# Patient Record
Sex: FEMALE | Race: BLACK OR AFRICAN AMERICAN | Employment: FULL TIME | URBAN - METROPOLITAN AREA
[De-identification: names, ages, dates, MRNs, and addresses within clinical notes are randomized per-mention and may not be internally consistent; named-entity substitution may affect disease eponyms.]

---

## 2021-01-28 ENCOUNTER — HOSPITAL ENCOUNTER (EMERGENCY)
Age: 51
Discharge: HOME | End: 2021-01-28
Attending: FAMILY MEDICINE
Payer: COMMERCIAL

## 2021-01-28 VITALS
SYSTOLIC BLOOD PRESSURE: 178 MMHG | HEART RATE: 90 BPM | DIASTOLIC BLOOD PRESSURE: 88 MMHG | HEIGHT: 66 IN | OXYGEN SATURATION: 98 % | TEMPERATURE: 97.1 F | RESPIRATION RATE: 20 BRPM | WEIGHT: 220 LBS | BODY MASS INDEX: 35.36 KG/M2

## 2021-01-28 DIAGNOSIS — T30.0 BURN: Primary | ICD-10-CM

## 2021-01-28 PROCEDURE — 99283 EMERGENCY DEPT VISIT LOW MDM: CPT

## 2021-01-28 PROCEDURE — 6370000000 HC RX 637 (ALT 250 FOR IP): Performed by: FAMILY MEDICINE

## 2021-01-28 RX ORDER — DIAPER,BRIEF,INFANT-TODD,DISP
EACH MISCELLANEOUS ONCE
Status: COMPLETED | OUTPATIENT
Start: 2021-01-28 | End: 2021-01-28

## 2021-01-28 RX ORDER — TRAMADOL HYDROCHLORIDE 50 MG/1
50 TABLET ORAL EVERY 8 HOURS PRN
Qty: 12 TABLET | Refills: 0 | Status: SHIPPED | OUTPATIENT
Start: 2021-01-28 | End: 2021-02-01

## 2021-01-28 RX ADMIN — BACITRACIN ZINC: 500 OINTMENT TOPICAL at 20:37

## 2021-01-28 ASSESSMENT — PAIN DESCRIPTION - ORIENTATION: ORIENTATION: LEFT

## 2021-01-28 ASSESSMENT — PAIN - FUNCTIONAL ASSESSMENT: PAIN_FUNCTIONAL_ASSESSMENT: ACTIVITIES ARE NOT PREVENTED

## 2021-01-28 ASSESSMENT — PAIN DESCRIPTION - FREQUENCY: FREQUENCY: CONTINUOUS

## 2021-01-28 ASSESSMENT — PAIN DESCRIPTION - PROGRESSION: CLINICAL_PROGRESSION: NOT CHANGED
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
## 2021-08-15 ENCOUNTER — APPOINTMENT (OUTPATIENT)
Dept: GENERAL RADIOLOGY | Age: 51
End: 2021-08-15
Payer: COMMERCIAL

## 2021-08-15 ENCOUNTER — HOSPITAL ENCOUNTER (EMERGENCY)
Age: 51
Discharge: HOME | End: 2021-08-15
Payer: COMMERCIAL

## 2021-08-15 VITALS
RESPIRATION RATE: 19 BRPM | BODY MASS INDEX: 41.59 KG/M2 | HEIGHT: 67 IN | TEMPERATURE: 97.1 F | HEART RATE: 78 BPM | DIASTOLIC BLOOD PRESSURE: 90 MMHG | WEIGHT: 265 LBS | OXYGEN SATURATION: 95 % | SYSTOLIC BLOOD PRESSURE: 152 MMHG

## 2021-08-15 DIAGNOSIS — S90.31XA CONTUSION OF RIGHT FOOT, INITIAL ENCOUNTER: ICD-10-CM

## 2021-08-15 DIAGNOSIS — S80.02XA CONTUSION OF LEFT KNEE, INITIAL ENCOUNTER: ICD-10-CM
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
## 2021-08-15 DIAGNOSIS — S90.121A CONTUSION OF FIFTH TOE OF RIGHT FOOT, INITIAL ENCOUNTER: Primary | ICD-10-CM

## 2021-08-15 PROCEDURE — 99283 EMERGENCY DEPT VISIT LOW MDM: CPT

## 2021-08-15 PROCEDURE — 96372 THER/PROPH/DIAG INJ SC/IM: CPT

## 2021-08-15 PROCEDURE — 73630 X-RAY EXAM OF FOOT: CPT

## 2021-08-15 PROCEDURE — 73560 X-RAY EXAM OF KNEE 1 OR 2: CPT

## 2021-08-15 PROCEDURE — 6360000002 HC RX W HCPCS

## 2021-08-15 RX ORDER — FLUTICASONE PROPIONATE 50 MCG
1 SPRAY, SUSPENSION (ML) NASAL DAILY
COMMUNITY

## 2021-08-15 RX ORDER — KETOROLAC TROMETHAMINE 30 MG/ML
INJECTION, SOLUTION INTRAMUSCULAR; INTRAVENOUS
Status: COMPLETED
Start: 2021-08-15 | End: 2021-08-15

## 2021-08-15 RX ORDER — KETOROLAC TROMETHAMINE 30 MG/ML
30 INJECTION, SOLUTION INTRAMUSCULAR; INTRAVENOUS ONCE
Status: COMPLETED | OUTPATIENT
Start: 2021-08-15 | End: 2021-08-15

## 2021-08-15 RX ADMIN — KETOROLAC TROMETHAMINE 30 MG: 30 INJECTION, SOLUTION INTRAMUSCULAR at 20:49

## 2021-08-15 RX ADMIN — KETOROLAC TROMETHAMINE 30 MG: 30 INJECTION, SOLUTION INTRAMUSCULAR; INTRAVENOUS at 20:49

## 2021-08-15 ASSESSMENT — PAIN SCALES - GENERAL
PAINLEVEL_OUTOF10: 8
PAINLEVEL_OUTOF10: 8

## 2021-08-15 ASSESSMENT — PAIN DESCRIPTION - DESCRIPTORS: DESCRIPTORS: CONSTANT

## 2021-08-15 ASSESSMENT — PAIN DESCRIPTION - LOCATION: LOCATION: TOE (COMMENT WHICH ONE)

## 2021-08-15 ASSESSMENT — PAIN DESCRIPTION - PAIN TYPE: TYPE: ACUTE PAIN

## 2021-08-15 ASSESSMENT — PAIN DESCRIPTION - ORIENTATION: ORIENTATION: RIGHT
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
## 2021-08-16 NOTE — ED NOTES
Instructions provided and questions answered. Segundo tap,ACE wrap and post op shoe applied per RN. circ check wnl.      Serg Bird RN  08/15/21 5038

## 2021-10-08 ENCOUNTER — HOSPITAL ENCOUNTER (OUTPATIENT)
Age: 51
Discharge: HOME | End: 2021-10-10

## 2021-10-08 PROCEDURE — 88307 TISSUE EXAM BY PATHOLOGIST: CPT

## 2021-10-08 PROCEDURE — 88305 TISSUE EXAM BY PATHOLOGIST: CPT

## 2021-10-29 ENCOUNTER — HOSPITAL ENCOUNTER (OUTPATIENT)
Age: 51
Discharge: HOME | End: 2021-10-31

## 2021-11-03 ENCOUNTER — INITIAL CONSULT (OUTPATIENT)
Dept: BARIATRICS/WEIGHT MGMT | Age: 51
End: 2021-11-03
Payer: COMMERCIAL

## 2021-11-03 VITALS
SYSTOLIC BLOOD PRESSURE: 168 MMHG | HEIGHT: 67 IN | TEMPERATURE: 97.9 F | RESPIRATION RATE: 20 BRPM | HEART RATE: 80 BPM | WEIGHT: 269 LBS | DIASTOLIC BLOOD PRESSURE: 99 MMHG | BODY MASS INDEX: 42.22 KG/M2

## 2021-11-03 DIAGNOSIS — K21.9 GASTROESOPHAGEAL REFLUX DISEASE WITHOUT ESOPHAGITIS: ICD-10-CM

## 2021-11-03 DIAGNOSIS — E66.01 MORBID OBESITY DUE TO EXCESS CALORIES (HCC): Primary | ICD-10-CM

## 2021-11-03 PROCEDURE — 1036F TOBACCO NON-USER: CPT | Performed by: SURGERY

## 2021-11-03 PROCEDURE — G8417 CALC BMI ABV UP PARAM F/U: HCPCS | Performed by: SURGERY

## 2021-11-03 PROCEDURE — G8484 FLU IMMUNIZE NO ADMIN: HCPCS | Performed by: SURGERY

## 2021-11-03 PROCEDURE — 3017F COLORECTAL CA SCREEN DOC REV: CPT | Performed by: SURGERY

## 2021-11-03 PROCEDURE — 99202 OFFICE O/P NEW SF 15 MIN: CPT

## 2021-11-03 PROCEDURE — 99204 OFFICE O/P NEW MOD 45 MIN: CPT | Performed by: SURGERY

## 2021-11-03 PROCEDURE — G8427 DOCREV CUR MEDS BY ELIG CLIN: HCPCS | Performed by: SURGERY

## 2021-11-03 NOTE — PROGRESS NOTES
Kirke Ganser [facility]  11/3/2021  [facility]    Initial Evaluation  Bariatric Surgery and EGD       Subjective:  CHIEF COMPLAINT: Morbid obesity, malnutrition, GERD    HISTORY OF PRESENT ILLNESS: Diamond Valle is a morbidly-obese 46 y.o.  female, who weighs 269 lb (122 kg). She is 106 pounds over her ideal body weight. The Body mass index is 42.13 kg/m². She has multiple medical problems aggravated by her obesity. She wishes to have bariatric surgery so that she can lose a large amount of weight and keep the weight off. I have met with [facility] in the [facility] where we discussed the surgery in great detail and went over the risks and benefits. She has watched our informational video so she understands all of the extensive risks involved. She states that she understands all of the risks and wishes to proceed with the evaluation. Past Medical HistoryThere is no problem list on file for this patient. Past Surgical History  Past Surgical History:   Procedure Laterality Date    CHOLECYSTECTOMY        Allergies: Codeine   No family history on file. Home Medications  Current Outpatient Medications   Medication Sig Dispense Refill    Fexofenadine-Pseudoephedrine (ALLEGRA-D PO) Take by mouth      fluticasone (FLONASE) 50 MCG/ACT nasal spray 1 spray by Each Nostril route daily       No current facility-administered medications for this visit. Social History:   TOBACCO:   reports that she has never smoked. She has never used smokeless tobacco.  All smokers must join the free smoking cessation program and stop smoking for 3 months before having any Bariatric surgery. ETOH:    reports no history of alcohol use. The patient has a family history that is negative for severe cardiovascular or respiratory issues, negative for reaction to anesthesia.       Review of Systems    Review of Systems - General ROS: negative for - chills, fatigue or malaise  ENT ROS: negative for - hearing change, nasal congestion or nasal discharge  Allergy and Immunology ROS: negative for - hives, itchy/watery eyes or nasal congestion  Hematological and Lymphatic ROS: negative for - blood clots, blood transfusions, bruising or fatigue  Endocrine ROS: negative for - malaise/lethargy, mood swings, palpitations or polydipsia/polyuria  Breast ROS: negative for - new or changing breast lumps or nipple changes  Respiratory ROS: negative for - sputum changes, stridor, tachypnea or wheezing  Cardiovascular ROS: negative for - irregular heartbeat, loss of consciousness, murmur or orthopnea  Gastrointestinal ROS: negative for - constipation, diarrhea, gas/bloating, heartburn or hematemesis  Genito-Urinary ROS: negative for - genital discharge, genital ulcers or hematuria  Musculoskeletal ROS: negative for - gait disturbance, muscle pain or muscular weakness}    Physical Exam:   VITALS: BP (!) 168/99 (Site: Right Upper Arm, Position: Sitting, Cuff Size: Large Adult)   Pulse 80   Temp 97.9 °F (36.6 °C) (Temporal)   Resp 20   Ht 5' 7\" (1.702 m)   Wt 269 lb (122 kg)   BMI 42.13 kg/m²   General appearance: alert, appears stated age and cooperative  Head: Normocephalic, without obvious abnormality, atraumatic  Neck: no adenopathy, no carotid bruit, no JVD, supple, symmetrical, trachea midline and thyroid not enlarged, symmetric, no tenderness/mass/nodules  Lungs: clear to auscultation bilaterally  Heart: regular rate and rhythm  Abdomen: soft, non-tender; bowel sounds normal; no masses,  no organomegaly  Extremities: extremities normal, atraumatic, no cyanosis or edema  : no CVA tenderness    Assessment:  Morbid obesity with inability to keep the weight off with diet and exercise. She is interested in Laparoscopic Christ-en- Y Gastric Bypass or Sleeve Gastrectomy. We discussed that our goal is to ameliorate the medical problems and not to obtain a specific body mass index.  She understands the risks and benefits, and wishes to proceed with the evaluation. Plan:  Psych evaluation, medical and cardio clearance, gallbladder is out. These patients often have vitamin deficiencies so I will do screening labs for malnutrition. I will do an upper endoscopy to check for hiatal hernias, ulcers and H. Pylori while we wait for insurance approval for the surgery.     Physician Signature: Electronically signed by Dr. Marco Lipscomb MD    Send copy of H&P to PCP, Rebecca Carrillo,

## 2021-11-03 NOTE — PATIENT INSTRUCTIONS
What is the next step to proceed with weight loss surgery? Please be aware that any co-pays or deductibles may be requested prior to testing and / or procedures. You will need to schedule a psychological evaluation for weight loss surgery. Patients will be required to complete all psychological testing as required by the mental health provider. Patients must also follow all of the provider's recommendations before weight loss surgery can be scheduled. The evaluation must be done a standard way for weight loss surgery. We strongly recommend that you contact one of our preferred providers listed below to arrange this:      Cecil Briseno, [facility]  [address], [place]   [phone]    [facility] and [address]   [phone]    Dr. Maureen Puri, PhD    [facility]. [place], [place]    [phone]      You will also need to plan on attending a 2 hour nutrition class at the [facility] prior to your surgery. We will schedule this for you when we schedule your surgery. Please remember to have your labs drawn 10 days prior to your first scheduled dietary appointment. Please remember, that while we will submit your case to insurance for surgery authorization, it is your responsibility to know if your plan covers weight loss surgery and keep up-to-date with changes to your insurance coverage. We will do everything possible to help you get approved for weight loss surgery, but cannot guarantee an approval.     Please note that you will not be submitted to your insurance company until all pre-operative testing requirements are met.

## 2021-11-04 ENCOUNTER — TELEPHONE (OUTPATIENT)
Dept: BARIATRICS/WEIGHT MGMT | Age: 51
End: 2021-11-04

## 2021-11-04 ENCOUNTER — PREP FOR PROCEDURE (OUTPATIENT)
Dept: SURGERY | Age: 51
End: 2021-11-04

## 2021-11-04 RX ORDER — SODIUM CHLORIDE, SODIUM LACTATE, POTASSIUM CHLORIDE, CALCIUM CHLORIDE 600; 310; 30; 20 MG/100ML; MG/100ML; MG/100ML; MG/100ML
INJECTION, SOLUTION INTRAVENOUS CONTINUOUS
Status: CANCELLED | OUTPATIENT
Start: 2021-11-04

## 2021-11-04 NOTE — TELEPHONE ENCOUNTER
Prior Authorization Form  DEMOGRAPHICS:    Patient Name:  Santosh Owens  Patient :  1970            Insurance:  Payor: [employer] / Plan: [employer] [address] / Product Type: *No Product type* /   Insurance ID Number:    Payor/Plan Subscr  Sex Relation Sub. Ins. ID Effective Group Num   1.  [address]* 1970 Female Self [id number]                                   [address]         DIAGNOSIS & PROCEDURE:    Procedure/Operation: EGD           CPT Code: 10991    Diagnosis:  Mendel Herald    ICD10 Code: K21.9    Location:  [facility]    Surgeon:  Harsha Kumari INFORMATION:    Date: 2021    Time:                Anesthesia:  MAC/TIVA                                                       Status:  Outpatient        Special Comments:         Electronically signed by Virgilio Gonzalez MA on 2021 at 9:25 AM

## 2021-11-18 ENCOUNTER — ANESTHESIA EVENT (OUTPATIENT)
Dept: ENDOSCOPY | Age: 51
End: 2021-11-18
Payer: COMMERCIAL

## 2021-11-18 NOTE — PROGRESS NOTES
[address] [facility]                                                                                                                    PRE OP [facility]        Date: 11/18/2021    Date of surgery: 11/19/21   Arrival Time: Hospital will call you between 5pm and 7pm with your final arrival time for surgery    1. Do not eat or drink anything after midnight prior to surgery. This includes no water, chewing gum, mints or ice chips. 2. Take the following medications with a small sip of water on the morning of Surgery: none     3. Diabetics may take evening dose of insulin but none after midnight. If you feel symptomatic or low blood sugar morning of surgery drink 1-2 ounces of apple juice only. 4. Aspirin, Ibuprofen, Advil, Naproxen, Vitamin E and other Anti-inflammatory products should be stopped  before surgery  as directed by your physician. Take Tylenol only unless instructed otherwise by your surgeon. 5. Check with your Doctor regarding stopping Plavix, Coumadin, Lovenox, Eliquis, Effient, or other blood thinners. 6. Do not smoke,use illicit drugs and do not drink any alcoholic beverages 24 hours prior to surgery. 7. You may brush your teeth the morning of surgery. DO NOT SWALLOW WATER    8. You MUST make arrangements for a responsible adult to take you home after your surgery. You will not be allowed to leave alone or drive yourself home. It is strongly suggested someone stay with you the first 24 hrs. Your surgery will be cancelled if you do not have a ride home. 9. PEDIATRIC PATIENTS ONLY:  A parent/legal guardian must accompany a child scheduled for surgery and plan to stay at the hospital until the child is discharged. Please do not bring other children with you.     10. Please wear simple, loose fitting clothing to the hospital.  Mirela Hernandez not bring valuables (money, credit cards, checkbooks, etc.) Do not wear any makeup (including no eye makeup) or nail polish on your fingers or toes. 11. DO NOT wear any jewelry or piercings on day of surgery. All body piercing jewelry must be removed. 12. Shower the night before surgery with _x__Antibacterial soap /ONEYDA WIPES________    13. TOTAL JOINT REPLACEMENT/HYSTERECTOMY PATIENTS ONLY---Remember to bring Blood Bank bracelet to the hospital on the day of surgery. 14. If you have a Living Will and Durable Power of  for Healthcare, please bring in a copy. 15. If appropriate bring crutches, inspirex, WALKER, CANE etc... 12. Notify your Surgeon if you develop any illness between now and surgery time, cough, cold, fever, sore throat, nausea, vomiting, etc.  Please notify your surgeon if you experience dizziness, shortness of breath or blurred vision between now & the time of your surgery. 17. If you have ___dentures, they will be removed before going to the OR; we will provide you a container. If you wear ___contact lenses or ___glasses, they will be removed; please bring a case for them. 18. To provide excellent care visitors will be limited to 1 in the room at any given time. 19. Please bring picture ID and insurance card. 20. Sleep apnea patients need to bring CPAP AND SETTINGS to hospital on day of surgery. 21. During flu season no children under the age of 15 are permitted in the hospital for the safety of all patients. 22. Other                  Please call AMBULATORY CARE if you have any further questions.    [address] [facility]     [address]

## 2021-11-19 ENCOUNTER — HOSPITAL ENCOUNTER (OUTPATIENT)
Age: 51
Setting detail: OUTPATIENT SURGERY
Discharge: HOME | End: 2021-11-19
Attending: SURGERY | Admitting: SURGERY
Payer: COMMERCIAL

## 2021-11-19 ENCOUNTER — ANESTHESIA (OUTPATIENT)
Dept: ENDOSCOPY | Age: 51
End: 2021-11-19
Payer: COMMERCIAL

## 2021-11-19 VITALS
BODY MASS INDEX: 42.22 KG/M2 | OXYGEN SATURATION: 99 % | DIASTOLIC BLOOD PRESSURE: 85 MMHG | TEMPERATURE: 97.3 F | HEART RATE: 80 BPM | WEIGHT: 269 LBS | HEIGHT: 67 IN | RESPIRATION RATE: 18 BRPM | SYSTOLIC BLOOD PRESSURE: 143 MMHG

## 2021-11-19 VITALS
OXYGEN SATURATION: 97 % | SYSTOLIC BLOOD PRESSURE: 161 MMHG | DIASTOLIC BLOOD PRESSURE: 79 MMHG | RESPIRATION RATE: 13 BRPM

## 2021-11-19 LAB — HCG(URINE) PREGNANCY TEST: NEGATIVE

## 2021-11-19 PROCEDURE — 99024 POSTOP FOLLOW-UP VISIT: CPT | Performed by: SURGERY

## 2021-11-19 PROCEDURE — 81025 URINE PREGNANCY TEST: CPT

## 2021-11-19 PROCEDURE — 3700000000 HC ANESTHESIA ATTENDED CARE: Performed by: SURGERY

## 2021-11-19 PROCEDURE — 43239 EGD BIOPSY SINGLE/MULTIPLE: CPT | Performed by: SURGERY

## 2021-11-19 PROCEDURE — 2580000003 HC RX 258: Performed by: SURGERY

## 2021-11-19 PROCEDURE — 7100000011 HC PHASE II RECOVERY - ADDTL 15 MIN: Performed by: SURGERY

## 2021-11-19 PROCEDURE — 6360000002 HC RX W HCPCS: Performed by: NURSE ANESTHETIST, CERTIFIED REGISTERED

## 2021-11-19 PROCEDURE — 7100000010 HC PHASE II RECOVERY - FIRST 15 MIN: Performed by: SURGERY

## 2021-11-19 PROCEDURE — 3700000001 HC ADD 15 MINUTES (ANESTHESIA): Performed by: SURGERY

## 2021-11-19 PROCEDURE — 88342 IMHCHEM/IMCYTCHM 1ST ANTB: CPT

## 2021-11-19 PROCEDURE — 2709999900 HC NON-CHARGEABLE SUPPLY: Performed by: SURGERY

## 2021-11-19 PROCEDURE — 3609012400 HC EGD TRANSORAL BIOPSY SINGLE/MULTIPLE: Performed by: SURGERY

## 2021-11-19 PROCEDURE — 88305 TISSUE EXAM BY PATHOLOGIST: CPT

## 2021-11-19 RX ORDER — PROPOFOL 10 MG/ML
INJECTION, EMULSION INTRAVENOUS CONTINUOUS PRN
Status: DISCONTINUED | OUTPATIENT
Start: 2021-11-19 | End: 2021-11-19 | Stop reason: SDUPTHER

## 2021-11-19 RX ORDER — SODIUM CHLORIDE, SODIUM LACTATE, POTASSIUM CHLORIDE, CALCIUM CHLORIDE 600; 310; 30; 20 MG/100ML; MG/100ML; MG/100ML; MG/100ML
INJECTION, SOLUTION INTRAVENOUS CONTINUOUS
Status: DISCONTINUED | OUTPATIENT
Start: 2021-11-19 | End: 2021-11-19 | Stop reason: HOSPADM

## 2021-11-19 RX ADMIN — PROPOFOL 150 MCG/KG/MIN: 10 INJECTION, EMULSION INTRAVENOUS at 09:39

## 2021-11-19 RX ADMIN — SODIUM CHLORIDE, POTASSIUM CHLORIDE, SODIUM LACTATE AND CALCIUM CHLORIDE: 600; 310; 30; 20 INJECTION, SOLUTION INTRAVENOUS at 07:43

## 2021-11-19 ASSESSMENT — PAIN SCALES - GENERAL
PAINLEVEL_OUTOF10: 0
PAINLEVEL_OUTOF10: 0

## 2021-11-19 ASSESSMENT — PAIN - FUNCTIONAL ASSESSMENT: PAIN_FUNCTIONAL_ASSESSMENT: 0-10

## 2021-11-19 NOTE — ANESTHESIA PRE PROCEDURE
Department of Anesthesiology  Preprocedure Note       Name:  Diamond Valle   Age:  46 y.o.  :  1970                                          MRN:  [id number]         Date:  2021      Surgeon: Kyrie Castaneda):  Alberto Lindquist MD    Procedure: Procedure(s):  EGD ESOPHAGOGASTRODUODENOSCOPY    Medications prior to admission:   Prior to Admission medications    Medication Sig Start Date End Date Taking? Authorizing Provider   Fexofenadine-Pseudoephedrine (ALLEGRA-D PO) Take by mouth daily as needed    Yes Historical Provider, MD   fluticasone (FLONASE) 50 MCG/ACT nasal spray 1 spray by Each Nostril route daily   Yes Historical Provider, MD       Current medications:    No current facility-administered medications for this encounter. Current Outpatient Medications   Medication Sig Dispense Refill    Fexofenadine-Pseudoephedrine (ALLEGRA-D PO) Take by mouth daily as needed       fluticasone (FLONASE) 50 MCG/ACT nasal spray 1 spray by Each Nostril route daily         Allergies: Allergies   Allergen Reactions    Codeine Hives       Problem List:  There is no problem list on file for this patient. Past Medical History:        Diagnosis Date    PONV (postoperative nausea and vomiting)        Past Surgical History:        Procedure Laterality Date     SECTION  46    CHOLECYSTECTOMY         Social History:    Social History     Tobacco Use    Smoking status: Never Smoker    Smokeless tobacco: Never Used   Substance Use Topics    Alcohol use:  No                                Counseling given: Not Answered      Vital Signs (Current):   Vitals:    21 1107   Weight: 269 lb (122 kg)   Height: 5' 7\" (1.702 m)                                              BP Readings from Last 3 Encounters:   21 (!) 168/99   08/15/21 (!) 152/90   21 (!) 178/88       NPO Status:                                                                                 BMI:   Wt Readings from Last 3 Encounters:   11/03/21 269 lb (122 kg)   08/15/21 265 lb (120.2 kg)   01/28/21 220 lb (99.8 kg)     Body mass index is 42.13 kg/m². CBC:   Lab Results   Component Value Date    WBC 7.3 03/25/2017    RBC 4.36 03/25/2017    HGB 12.5 03/25/2017    HCT 37.7 03/25/2017    MCV 86.5 03/25/2017    RDW 12.9 03/25/2017     03/25/2017       CMP:   Lab Results   Component Value Date     03/25/2017    K 4.4 03/25/2017    CL 98 03/25/2017    CO2 24 03/25/2017    BUN 10 03/25/2017    CREATININE 1.0 03/25/2017    GFRAA >60 03/25/2017    LABGLOM >60 03/25/2017    GLUCOSE 117 03/25/2017    PROT 7.5 03/25/2017    CALCIUM 9.4 03/25/2017    BILITOT 0.3 03/25/2017    ALKPHOS 98 03/25/2017    AST 25 03/25/2017    ALT 14 03/25/2017       POC Tests: No results for input(s): POCGLU, POCNA, POCK, POCCL, POCBUN, POCHEMO, POCHCT in the last 72 hours. Coags: No results found for: PROTIME, INR, APTT    HCG (If Applicable): No results found for: PREGTESTUR, PREGSERUM, HCG, HCGQUANT     ABGs: No results found for: PHART, PO2ART, QEV8YMR, LTH7NCG, BEART, V8JETASZ     Type & Screen (If Applicable):  No results found for: LABABO, LABRH    Drug/Infectious Status (If Applicable):  No results found for: HIV, HEPCAB    COVID-19 Screening (If Applicable): No results found for: COVID19        Anesthesia Evaluation  Patient summary reviewed   history of anesthetic complications ( PONV associated with the use of codeine which causes severe nausea and vomiting.): PONV. Airway: Mallampati: II     Neck ROM: full  Mouth opening: > = 3 FB Dental:      Comment: Dentition intact,  single missing upper molar bilaterally, denies any loose teeth.     Pulmonary:Negative Pulmonary ROS breath sounds clear to auscultation                             Cardiovascular:Negative CV ROS  Exercise tolerance: good (>4 METS),   (+) hyperlipidemia        Rhythm: regular  Rate: normal                    Neuro/Psych:   Negative Neuro/Psych ROS GI/Hepatic/Renal:   (+) morbid obesity ( Super morbid obesity)          Endo/Other: Negative Endo/Other ROS                    Abdominal:   (+) obese (Super morbid obesity),           Vascular: negative vascular ROS. Other Findings:           Anesthesia Plan      MAC     ASA 3       Induction: intravenous. Anesthetic plan and risks discussed with patient. Plan discussed with CRNA.                 Dominga Fletcher MD   11/18/2021

## 2021-11-19 NOTE — H&P
Initial Evaluation  Bariatric Surgery and EGD       Subjective:  CHIEF COMPLAINT: Morbid obesity, malnutrition, GERD     HISTORY OF PRESENT ILLNESS: Augustus Celis is a morbidly-obese 46 y.o.  female, who weighs 269 lb (122 kg). She is 106 pounds over her ideal body weight. The Body mass index is 42.13 kg/m². She has multiple medical problems aggravated by her obesity. She wishes to have bariatric surgery so that she can lose a large amount of weight and keep the weight off. I have [facility] with [facility] where we discussed the surgery in great detail and went over the risks and benefits. She has watched our informational video so she understands all of the extensive risks involved. She states that she understands all of the risks and wishes to proceed with the evaluation.     Past Medical HistoryThere is no problem list on file for this patient.     Past Surgical History  Past Surgical History         Past Surgical History:   Procedure Laterality Date    CHOLECYSTECTOMY             Allergies: Codeine   Family History   No family history on file.        Home Medications  Current Facility-Administered Medications          Current Outpatient Medications   Medication Sig Dispense Refill    Fexofenadine-Pseudoephedrine (ALLEGRA-D PO) Take by mouth        fluticasone (FLONASE) 50 MCG/ACT nasal spray 1 spray by Each Nostril route daily          No current facility-administered medications for this visit. Social History:   TOBACCO:   reports that she has never smoked. She has never used smokeless tobacco.  All smokers must join the free smoking cessation program and stop smoking for 3 months before having any Bariatric surgery. ETOH:    reports no history of alcohol use.    The patient has a family history that is negative for severe cardiovascular or respiratory issues, negative for reaction to anesthesia.        Review of Systems     Review of Systems - General ROS: negative for - chills, fatigue or malaise  ENT ROS: negative for - hearing change, nasal congestion or nasal discharge  Allergy and Immunology ROS: negative for - hives, itchy/watery eyes or nasal congestion  Hematological and Lymphatic ROS: negative for - blood clots, blood transfusions, bruising or fatigue  Endocrine ROS: negative for - malaise/lethargy, mood swings, palpitations or polydipsia/polyuria  Breast ROS: negative for - new or changing breast lumps or nipple changes  Respiratory ROS: negative for - sputum changes, stridor, tachypnea or wheezing  Cardiovascular ROS: negative for - irregular heartbeat, loss of consciousness, murmur or orthopnea  Gastrointestinal ROS: negative for - constipation, diarrhea, gas/bloating, heartburn or hematemesis  Genito-Urinary ROS: negative for - genital discharge, genital ulcers or hematuria  Musculoskeletal ROS: negative for - gait disturbance, muscle pain or muscular weakness}     Physical Exam:   VITALS: BP (!) 168/99 (Site: Right Upper Arm, Position: Sitting, Cuff Size: Large Adult)   Pulse 80   Temp 97.9 °F (36.6 °C) (Temporal)   Resp 20   Ht 5' 7\" (1.702 m)   Wt 269 lb (122 kg)   BMI 42.13 kg/m²   General appearance: alert, appears stated age and cooperative  Head: Normocephalic, without obvious abnormality, atraumatic  Neck: no adenopathy, no carotid bruit, no JVD, supple, symmetrical, trachea midline and thyroid not enlarged, symmetric, no tenderness/mass/nodules  Lungs: clear to auscultation bilaterally  Heart: regular rate and rhythm  Abdomen: soft, non-tender; bowel sounds normal; no masses,  no organomegaly  Extremities: extremities normal, atraumatic, no cyanosis or edema  : no CVA tenderness     Assessment:  Morbid obesity with inability to keep the weight off with diet and exercise. She is interested in Laparoscopic Christ-en- Y Gastric Bypass or Sleeve Gastrectomy.   We discussed that our goal is to ameliorate the medical problems and not to obtain a specific body mass index. She understands the risks and benefits, and wishes to proceed with the evaluation.     Plan:  Psych evaluation, medical and cardio clearance, gallbladder is out. These patients often have vitamin deficiencies so I will do screening labs for malnutrition.  I will do an upper endoscopy to check for hiatal hernias, ulcers and H. Pylori while we wait for insurance approval for the surgery.     Physician Signature: Electronically signed by Dr. Rebeca Levy MD

## 2021-11-19 NOTE — OP NOTE
SURGEON: Paula Butler M.D. PREOPERATIVE DIAGNOSES:  gerd    POSTOPERATIVE DIAGNOSES: 3cm hiatal hernia     OPERATION: [id number] with biopsy    BLOOD LOSS: 0ML    ANESTHESIA: LMAC    COMPLICATIONS: None. OPERATIONS: The patient was placed on the table in left lateral decubitus position and sedated. Bite block was placed. A lubricated scope was easily passed into the upper esophagus which looked normal. The distal esophagus looked inflamed. The scope was passed into the stomach and retroflexed. There was 3cm hiatal hernia. The scope was passed down toward the pylorus. The antral mucosa all looked normal. Biopsy was taken to check for H. pylori. The scope was then passed through the pylorus into the duodenal bulb which looked normal, then around to the distal duodenum which looked normal, and the scope was then withdrawn. The GE junction was at 36 cm from the teeth. The patient tolerated the procedure well.      Physician Signature: Electronically signed by Dr. Maggie Rolle

## 2021-11-19 NOTE — ANESTHESIA POSTPROCEDURE EVALUATION
Department of Anesthesiology  Postprocedure Note    Patient: Letty Garcia  MRN: [id number]  [place]: 1970  Date of evaluation: 11/19/2021  Time:  10:41 AM     Procedure Summary     Date: 11/19/21 Room / Location: [address] [place] [address] 01 / [address] [place] [address]    Anesthesia Start: 0820 Anesthesia Stop: 1061    Procedure: EGD BIOPSY (N/A ) Diagnosis: (GERD)    Surgeons: Viry Martel MD Responsible Provider: Katie Kirk MD    Anesthesia Type: MAC ASA Status: 3          Anesthesia Type: MAC    Noemí Phase I: Noemí Score: 10    Noemí Phase II: Noemí Score: 10    Last vitals: Reviewed and per EMR flowsheets.        Anesthesia Post Evaluation    Patient location during evaluation: bedside  Patient participation: complete - patient participated  Level of consciousness: awake and alert  Pain score: 0  Airway patency: patent  Nausea & Vomiting: no nausea and no vomiting  Complications: no  Cardiovascular status: hemodynamically stable  Respiratory status: acceptable  Hydration status: euvolemic

## 2021-11-22 ENCOUNTER — TELEPHONE (OUTPATIENT)
Dept: BARIATRICS/WEIGHT MGMT | Age: 51
End: 2021-11-22

## 2021-11-22 NOTE — TELEPHONE ENCOUNTER
I called this patient regarding their labs being drawn for their upcoming appointment. The patient stated that they would get them drawn before the appt.

## 2021-11-23 ENCOUNTER — HOSPITAL ENCOUNTER (OUTPATIENT)
Age: 51
Discharge: HOME | End: 2021-11-23
Payer: COMMERCIAL

## 2021-11-23 DIAGNOSIS — E66.01 MORBID OBESITY DUE TO EXCESS CALORIES (HCC): Primary | ICD-10-CM

## 2021-11-23 DIAGNOSIS — E66.01 MORBID OBESITY DUE TO EXCESS CALORIES (HCC): ICD-10-CM

## 2021-11-23 LAB
CHOLESTEROL, TOTAL: 187 MG/DL (ref 0–199)
FERRITIN: 66 NG/ML
FOLATE: 8.6 NG/ML (ref 4.8–24.2)
TRIGL SERPL-MCNC: 95 MG/DL (ref 0–149)
VITAMIN B-12: 236 PG/ML (ref 211–946)

## 2021-11-23 PROCEDURE — 82607 VITAMIN B-12: CPT

## 2021-11-23 PROCEDURE — 82728 ASSAY OF FERRITIN: CPT

## 2021-11-23 PROCEDURE — 84630 ASSAY OF ZINC: CPT

## 2021-11-23 PROCEDURE — 82746 ASSAY OF FOLIC ACID SERUM: CPT

## 2021-11-23 PROCEDURE — 84478 ASSAY OF TRIGLYCERIDES: CPT

## 2021-11-23 PROCEDURE — 36415 COLL VENOUS BLD VENIPUNCTURE: CPT

## 2021-11-23 PROCEDURE — 82465 ASSAY BLD/SERUM CHOLESTEROL: CPT

## 2021-11-23 PROCEDURE — 84425 ASSAY OF VITAMIN B-1: CPT

## 2021-11-29 LAB
VITAMIN B1 WHOLE BLOOD: 53 NMOL/L (ref 70–180)
ZINC: 79.5 UG/DL (ref 60–120)

## 2021-12-01 ENCOUNTER — OFFICE VISIT (OUTPATIENT)
Dept: BARIATRICS/WEIGHT MGMT | Age: 51
End: 2021-12-01
Payer: COMMERCIAL

## 2021-12-01 VITALS
HEART RATE: 87 BPM | RESPIRATION RATE: 20 BRPM | TEMPERATURE: 97.8 F | DIASTOLIC BLOOD PRESSURE: 87 MMHG | WEIGHT: 277 LBS | BODY MASS INDEX: 43.47 KG/M2 | SYSTOLIC BLOOD PRESSURE: 142 MMHG | HEIGHT: 67 IN

## 2021-12-01 DIAGNOSIS — Z01.818 PRE-OP EVALUATION: Primary | ICD-10-CM

## 2021-12-01 PROCEDURE — 99214 OFFICE O/P EST MOD 30 MIN: CPT | Performed by: SURGERY

## 2021-12-01 PROCEDURE — 3017F COLORECTAL CA SCREEN DOC REV: CPT | Performed by: SURGERY

## 2021-12-01 PROCEDURE — G8484 FLU IMMUNIZE NO ADMIN: HCPCS | Performed by: SURGERY

## 2021-12-01 PROCEDURE — G8417 CALC BMI ABV UP PARAM F/U: HCPCS | Performed by: SURGERY

## 2021-12-01 PROCEDURE — 1036F TOBACCO NON-USER: CPT | Performed by: SURGERY

## 2021-12-01 PROCEDURE — 99211 OFF/OP EST MAY X REQ PHY/QHP: CPT

## 2021-12-01 PROCEDURE — G8427 DOCREV CUR MEDS BY ELIG CLIN: HCPCS | Performed by: SURGERY

## 2021-12-01 RX ORDER — AMOXICILLIN 250 MG/1
250 CAPSULE ORAL 3 TIMES DAILY
Qty: 30 CAPSULE | Refills: 0 | Status: SHIPPED | OUTPATIENT
Start: 2021-12-01 | End: 2021-12-11

## 2021-12-01 RX ORDER — CLARITHROMYCIN 500 MG/1
500 TABLET, COATED ORAL 2 TIMES DAILY
Qty: 20 TABLET | Refills: 0 | Status: SHIPPED | OUTPATIENT
Start: 2021-12-01 | End: 2021-12-11

## 2021-12-01 RX ORDER — THIAMINE MONONITRATE (VIT B1) 100 MG
100 TABLET ORAL DAILY
Qty: 30 TABLET | Refills: 3 | Status: SHIPPED
Start: 2021-12-01 | End: 2022-04-27

## 2021-12-01 NOTE — PROGRESS NOTES
Hu Rodriguez  12/1/2021  [address]     Post-EGD Follow-up. Subjective:   Hu Rodriguez is a 46 y.o. female post EGD done 2 weeks ago. She did have a hiatal hernia, did have gastritis. Biopsy did show Helicobacter pylori. The gallbladder is out. Weight: 277 lb (125.6 kg). Physical exam:    BP (!) 142/87 (Site: Left Upper Arm, Position: Sitting, Cuff Size: Large Adult)   Pulse 87   Temp 97.8 °F (36.6 °C) (Temporal)   Resp 20   Ht 5' 7\" (1.702 m)   Wt 277 lb (125.6 kg)   BMI 43.38 kg/m²   General appearance: alert, appears stated age and cooperative  Lungs: clear to auscultation bilaterally  Heart: regular rate and rhythm  Abdomen: soft, non-tender; bowel sounds normal; no masses,  no organomegaly    Assessment:    Doing well two weeks post EGD, hiatal hernia, low thiamine, h pylori gastritis    Plan: will fix HH with wt loss surgery, will supplement thiamine and will treat h pylori with abx. Stay on the high protein, low calorie diet while waiting for insurance approval. Walk as much as possible but keep your legs elevated when sitting. Continue deep breathing exercizes. Aim for 60 gm Protein and 90 oz of liquids per day. Track protein and fluid intake. Make sure the bowel move daily by taking fiber such as Metamucil. We discussed results and surgery for over 15 minutes.       Physician Signature: Electronically signed by Dr. Lorraine Duron MD   Cc:  Fidel Lazo DO

## 2021-12-01 NOTE — PATIENT INSTRUCTIONS
What is the next step to proceed with weight loss surgery? Please be aware that any co-pays or deductibles may be requested prior to testing and / or procedures. You will need to schedule a psychological evaluation for weight loss surgery. Patients will be required to complete all psychological testing as required by the mental health provider. Patients must also follow all of the provider's recommendations before weight loss surgery can be scheduled. The evaluation must be done a standard way for weight loss surgery. We strongly recommend that you contact one of our preferred providers listed below to arrange this:      Cecil Hunter, [facility]  [address] [place], [place]   [phone]    [facility] and [address]   [phone]    Dr. Queta Becerril, PhD    [address] [place], [place]    [phone]      You will also need to plan on attending a 2 hour nutrition class at the [facility] prior to your surgery. We will schedule this for you when we schedule your surgery. Please remember to have your labs drawn 10 days prior to your first scheduled dietary appointment. Please remember, that while we will submit your case to insurance for surgery authorization, it is your responsibility to know if your plan covers weight loss surgery and keep up-to-date with changes to your insurance coverage. We will do everything possible to help you get approved for weight loss surgery, but cannot guarantee an approval.     Please note that you will not be submitted to your insurance company until all pre-operative testing requirements are met.

## 2021-12-08 ENCOUNTER — INITIAL CONSULT (OUTPATIENT)
Dept: BARIATRICS/WEIGHT MGMT | Age: 51
End: 2021-12-08

## 2021-12-08 VITALS — WEIGHT: 277 LBS | BODY MASS INDEX: 43.47 KG/M2 | HEIGHT: 67 IN

## 2021-12-08 DIAGNOSIS — E66.01 MORBID OBESITY DUE TO EXCESS CALORIES (HCC): ICD-10-CM

## 2021-12-08 DIAGNOSIS — Z71.3 DIETARY COUNSELING: Primary | ICD-10-CM

## 2021-12-08 DIAGNOSIS — E51.9 VITAMIN B1 DEFICIENCY: ICD-10-CM

## 2021-12-08 PROCEDURE — 99999 PR OFFICE/OUTPT VISIT,PROCEDURE ONLY: CPT

## 2021-12-08 NOTE — PROGRESS NOTES
Chewing  no - Swallowing  no - Nausea  no - Vomiting  no - Diarrhea  no - Constipation   Mild lately  no - Hair Changes  no - Skin Changes  no - Tongue Texture    Food Allergies: no   Food Intolerances: no    Mild gas with milk consumption    Yes - Past medically assisted weight loss history reviewed. Yes - Provided education regarding the basic role of nutrition in junction with Bariatric Surgery. Yes - Provided overview of required dietary and behavioral changes pre and post operatively. Yes - Stated / reinforced that changes in dietary behaviors are critical to successful, long term weight Loss. Patient is aware that in order to proceed with bariatric surgery he / she will need to follow a low-fat diet prior to surgery. Patient is aware that bariatric surgery is a lifetime change that will require the patient to follow a low-fat, low-calorie, low-sugar, portion controlled diet with at least 30 minutes of physical activity daily. Patient is aware that certain foods may not be tolerated after bariatric surgery and certain foods will need avoided all together. Tonya Garcia / LD feels that this patient knowledge / readiness to make appropriate diet / lifestyle changes assessed to be? - Good    ТАТЬЯНА / LD feels this patients expected adherence for post surgical diet? - Good    Patient was instructed and signed consents on a low-fat diet and required supplementation at initial consult. Comments: Patient is able to verbalize diet concepts for bariatric surgery. Patient is aware diet concepts will be reinforced at 2-hour nutrition education consult. RD / LD will monitor progress.

## 2021-12-08 NOTE — PATIENT INSTRUCTIONS
[facility] and [facility]  Dietary Initial Appointment Patient Instructions    By: Kimberlyn Newton RD / ERROL  [phone]      At your initial dietary appointment you have received the following information packets:    Please be aware at each visit you have been instructed that in order for your insurance company to approve your surgery you must show a consistent weight loss of 2 lbs or greater at each visit. We can not guarantee an approval by your insurance company we can only provide the information given to us it is up to you the patient to show compliancy to your insurance company. If you do gain weight during your supervised weight loss counseling sessions insurance companies starting in 2018 are denying patients for not showing consistent weight loss results when part of a supervised weight loss counseling program. Pt was instructed on 12/8/21. Pt verbalized understanding. · Low-Fat Diet  - Please be sure to begin your low-fat diet from today's date until your scheduled surgery date. If you are not at goal weight by your history and physical appointment with your surgeon your surgery will be cancelled. · Goal Weight: 259 lbs (BMI of 40.5)  · Low-Fat Diet Contract - Patient Acknowledge and Signed  · Supplement List - Please be sure to be saving to purchase your 3 month supply of supplements. If you do not bring supplements to your history and physical appointment your surgery will be cancelled. · Supplement Contract - Patient Acknowledge and Signed  · Please be sure to take a daily Multi-vitamin from now until surgery to reduce your incidence of infection. · If your insurance company requires additional dietary counseling you will be scheduled to see the dietitian the following month. ·  If your psychological evaluation is completed and all your dietary requirements for your individual insurance company have been completed you will be submitted to insurance.  Please Please note that you will not be submitted to your insurance company until all   pre-operative testing requirements are met. · Please remember you need to schedule to attend one Support Group meeting held at the [facility] before surgery. This one meeting is mandatory. You can attend as many support group meetings before and after surgery. Support group meetings are held at the [facility] from 6:00 - 8:00pm 1st, and 3rd Tuesday of every month. See dates listed below. · Each individual person has his / her own medical requirements that need fulfilled before being able to schedule bariatric surgery . Please finalize these requirements by contacting our Bariatric Nurse at [phone].    11/23/21 Pre-Op Labs  Vitamin B1 53L, Ferritin 66 WNL, Hgb 12.0 WNL, Hct 37.8 WNL    Note:  Pt had vitamin B1 deficiency. Dr James Uriostegui ordered vit B1 - 100 mg - to be taken once daily and pt is doing so. Gave pt requisition to have lab rechecked after taking it for 6- 8 weeks and result will be discussed when available. High Thiamin Foods  Written By: Blair Amador RD/LD    What role does Thiamin play in the body? Thiamin helps all body cells produce energy from carbohydrates. Thiamin supports appetite and nerve function. Where can Thiamin be found? Thiamin occurs in all nutritious foods in moderate amounts. Examples - Pork, Ham, Liver, other Organ Meats, Whole-Grain and Enriched Breads, Fortified Cereals, Legumes and Nuts. People who derive a large proportion of their energy from empty K-Calorie items like sugar or alcohol risk thiamin deficiency. What occurs when you are deficient in Thiamin? Nerve Damage  Blood/Circulatory System:  Edema, Enlarged Heart, Abnormal Heart Rhythms, Heart Failure.     Nervous/Muscular System:  Degeneration, Wasting, Weakness, Painful Calf Muscles, Low-Morale, Difficulty Walking, Loss of Ankle and Knee-Jerk Reflexes, Mental Confusion and Paralysis    If you get too much:  Excess amounts of Thiamin are excreted in the urine. Contrary to popular claims, extra amounts have no energy boosting effect. Thiamin in Commonly Eaten Foods Ranked Richest to [employer]:      Food   Serving Size   Milligrams of   Thiamin     Pork Chop   3.1 oz. broiled (278 Kcal)    1.23 mg     Ham   3oz. canned roasted (140 kcal)   . 80 mg     Green Peas   1 cup cooked (126 Kcal)   . 50 mg     Black Beans   1 cup cooked (227 Kcal)   . 40 mg     Watermelon   1 Slice (932 Kcal)   . 35 mg     Split, Peas   1 cup cooked (231 Kcal)    . 35 mg     Food   Serving Size   Milligrams of   Thiamin     Black-eyed Peas   1 cup cooked (198 Kcal)   . 30 mg     Kidney Beans   1cup cooked (217 Kcal)    . 28 mg     Oatmeal    1 cup cooked (145 Kcal)   . 28 mg     [place] Squash   1cup boiled (83 Kcal)    . 28 mg     Winter Squash   1 cup baked (95 Kcal)    . 25 mg     Baked, Potato   1 Whole (220 Kcal)   . 23 mg     Asparagus   1 cup cooked (45 Kcal)   . 22 mg     Yogurt, nonfat   1 cup (136 Kcal)   . 10 mg     Sirloin Steak   3 oz. Lean (171 Kcal)   . 10 mg     Cantaloupe   ½ (94 Kcal)   . 10 mg     Honeydew Melon   1/10 (45 Kcal)    . 10 mg     Tofu(Soybean Curd)   ½ cup (94 Kcal)   . 10 mg     Bread, Whole Wheat   1 slice    . 10 mg     Green Beans   1 cup cooked (44 Kcal)   . 10 mg     Patel Sprouts   1 cup Fresh (31 Kcal)   . 10 mg     Broccoli   1cup cooked (44 Kcal)    . 10 mg     Cauliflower   1 cup cooked (30 Kcal)   . 10 mg     Summer Squash   1 cup  cooked (36 Kcal)   . 10 mg     Mushrooms   1 cup raw sliced (18 Kcal)   . 10 mg     Zucchini   1 cup cooked (29 Kcal)   . 10 mg     Tomato   1 whole raw (26 Kcal)   . 10 mg       Food     Serving Size     Milligrams of   Thiamin     Tristin Lettuce    1 cup chopped (9 Kcal)   . 5 mg     Chicken Breast   ½ roasted (97 Kcal)   . 5 mg     Loose- Leaf Lettuce   1 cup (10 Kcal)   . 3 mg     Apple   1 fresh medium (81 Kcal)   . 3 mg     Low-fat Cheddar Cheese   1 oz. (114 Kcal)   . 2 mg       Unfortunately after bariatric surgery you will not be able to increase thiamin level with diet alone. A thiamin supplement may need to be added in order to improve thiamin lab values.

## 2021-12-15 ENCOUNTER — OFFICE VISIT (OUTPATIENT)
Dept: CARDIOLOGY CLINIC | Age: 51
End: 2021-12-15
Payer: COMMERCIAL

## 2021-12-15 VITALS
OXYGEN SATURATION: 93 % | HEART RATE: 69 BPM | RESPIRATION RATE: 16 BRPM | SYSTOLIC BLOOD PRESSURE: 128 MMHG | HEIGHT: 67 IN | WEIGHT: 281.2 LBS | BODY MASS INDEX: 44.13 KG/M2 | DIASTOLIC BLOOD PRESSURE: 84 MMHG

## 2021-12-15 DIAGNOSIS — E66.01 MORBID OBESITY (HCC): ICD-10-CM

## 2021-12-15 DIAGNOSIS — Z01.810 PREOP CARDIOVASCULAR EXAM: Primary | ICD-10-CM

## 2021-12-15 PROCEDURE — G8427 DOCREV CUR MEDS BY ELIG CLIN: HCPCS | Performed by: INTERNAL MEDICINE

## 2021-12-15 PROCEDURE — 93000 ELECTROCARDIOGRAM COMPLETE: CPT | Performed by: INTERNAL MEDICINE

## 2021-12-15 PROCEDURE — 99243 OFF/OP CNSLTJ NEW/EST LOW 30: CPT | Performed by: INTERNAL MEDICINE

## 2021-12-15 PROCEDURE — G8417 CALC BMI ABV UP PARAM F/U: HCPCS | Performed by: INTERNAL MEDICINE

## 2021-12-15 PROCEDURE — G8484 FLU IMMUNIZE NO ADMIN: HCPCS | Performed by: INTERNAL MEDICINE

## 2021-12-15 NOTE — PROGRESS NOTES
Chief Complaint   Patient presents with    Cardiac Clearance       Patient Active Problem List    Diagnosis Date Noted    Morbid obesity ([facility] [address] 12/15/2021    Gastroesophageal reflux disease without esophagitis        Current Outpatient Medications   Medication Sig Dispense Refill    vitamin B-1 (THIAMINE) 100 MG tablet Take 1 tablet by mouth daily 30 tablet 3    Fexofenadine-Pseudoephedrine (ALLEGRA-D PO) Take by mouth daily as needed       fluticasone (FLONASE) 50 MCG/ACT nasal spray 1 spray by Each Nostril route daily       No current facility-administered medications for this visit. Allergies   Allergen Reactions    Codeine Hives       Vitals:    12/15/21 1152   Weight: 281 lb 3.2 oz (127.6 kg)   Height: 5' 7\" (1.702 m)       Social History     Socioeconomic History    Marital status:      Spouse name: Not on file    Number of children: Not on file    Years of education: Not on file    Highest education level: Not on file   Occupational History    Not on file   Tobacco Use    Smoking status: Never Smoker    Smokeless tobacco: Never Used   Substance and Sexual Activity    Alcohol use: No    Drug use: No    Sexual activity: Not on file   Other Topics Concern    Not on file   Social History Narrative    Not on file     Social Determinants of Health     Financial Resource Strain:     Difficulty of Paying Living Expenses: Not on file   Food Insecurity:     Worried About Running Out of Food in the Last Year: Not on file    Anila of Food in the Last Year: Not on file   Transportation Needs:     Lack of Transportation (Medical): Not on file    Lack of Transportation (Non-Medical):  Not on file   Physical Activity:     Days of Exercise per Week: Not on file    Minutes of Exercise per Session: Not on file   Stress:     Feeling of Stress : Not on file   Social Connections:     Frequency of Communication with Friends and Family: Not on file    Frequency of Social Gatherings with Friends and Family: Not on file    Attends Christianity Services: Not on file    Active Member of Clubs or Organizations: Not on file    Attends Club or Organization Meetings: Not on file    Marital Status: Not on file   Intimate Partner Violence:     Fear of Current or Ex-Partner: Not on file    Emotionally Abused: Not on file    Physically Abused: Not on file    Sexually Abused: Not on file   Housing Stability:     Unable to Pay for Housing in the Last Year: Not on file    Number of [place] in the Last Year: Not on file    Unstable Housing in the Last Year: Not on file       History reviewed. No pertinent family history. SUBJECTIVE: Scarlett Roberson presents to the office today for consult for pre-op evaluation prior to bariatric surgery with Dr. Lilliam Ley  She complains of no new or unstable cardiac symptoms,  and denies chest pain, chest pressure/discomfort, claudication, dyspnea, exertional chest pressure/discomfort, fatigue, irregular heart beat, lower extremity edema, near-syncope, orthopnea, palpitations, paroxysmal nocturnal dyspnea, syncope and tachypnea   Is a , visiting sites, does all AODLs and up til a month ago doing aerobics. Review of Systems:   Heart: as above   Lungs: as above   Eyes: denies changes in vision or discharge. Ears: denies changes in hearing or pain. Nose: denies epistaxis or masses   Throat: denies sore throat or trouble swallowing. Neuro: denies numbness, tingling, tremors. Skin: denies rashes or itching. : denies hematuria, dysuria   GI: denies vomiting, diarrhea   Psych: denies mood changed, anxiety, depression. all others negative. Physical Exam   Ht 5' 7\" (1.702 m)   Wt 281 lb 3.2 oz (127.6 kg)   BMI 44.04 kg/m²   Constitutional: Oriented to person, place, and time. Obese No distress. Head: Normocephalic and atraumatic. Eyes: EOM are normal. Pupils are equal, round, and reactive to light.    Neck: Normal range of motion. Neck supple. No hepatojugular reflux and no JVD present. Carotid bruit is not present. Cardiovascular: Normal rate, regular rhythm, normal heart sounds and intact distal pulses. Exam reveals no gallop and no friction rub. No murmur heard. Pulmonary/Chest: Effort normal and breath sounds normal. No respiratory distress. No wheezes. No rales. Abdominal: Soft. Bowel sounds are normal. No distension and no mass. No tenderness. No rebound and no guarding. Musculoskeletal: Normal range of motion. No edema and no tenderness. Neurological: Alert and oriented to person, place, and time. Skin: Skin is warm and dry. No rash noted. Not diaphoretic. No erythema. Psychiatric: Normal mood and affect. Behavior is normal.     EKG:  normal EKG, normal sinus rhythm, unchanged from previous tracings. ASSESSMENT AND PLAN:  Patient Active Problem List   Diagnosis    Gastroesophageal reflux disease without esophagitis    Morbid obesity ([address]       ·  Patient has no high risk clinical predictors of an adverse outcome in surgery, such as recent myocardial infarction, unstable angina, heart failure, rhythm other than sinus, severe obstructive valvular disease,cva, insulin, ckd  · Able to achieve 4 METS with AODLs  · Normal CV exam and ekg  · RCRI Class I risk (< 1% chance of cardiac complication).        Romana Lively, M.D  [facility]

## 2022-01-11 ENCOUNTER — INITIAL CONSULT (OUTPATIENT)
Dept: BARIATRICS/WEIGHT MGMT | Age: 52
End: 2022-01-11

## 2022-01-11 VITALS — WEIGHT: 277 LBS | HEIGHT: 67 IN | BODY MASS INDEX: 43.47 KG/M2

## 2022-01-11 DIAGNOSIS — Z71.3 DIETARY COUNSELING: Primary | ICD-10-CM

## 2022-01-11 DIAGNOSIS — E66.01 MORBID OBESITY DUE TO EXCESS CALORIES (HCC): ICD-10-CM

## 2022-01-11 PROCEDURE — 99999 PR OFFICE/OUTPT VISIT,PROCEDURE ONLY: CPT

## 2022-01-11 NOTE — PROGRESS NOTES
WEIGHT:  277 lb (125.6 kg)    Pt was in th office for his/her 2nd weight Loss appointment. Pt is aware he/she must meet his/her pre-op weight loss goal in order to proceed with weight loss surgery. Roger / Ildefonso faxed a copy of what was reviewed with the pt to her PCP. Pt verbalized understanding. Roger// Ildefonso enc the following at today's visit for successful post-surgical Weight Maintenance    1. Weigh yourself daily and record it. 2. Keep documented food records daily   3. Just be more active in day to day routine   4. Higher protein intake and a higher fiber intake. Not a high protein or a high fiber diet just a higher intake. 5.Eliminate empty calorie consumption    Roger Fregoso addressed the following with the pt:  - Roger Fregoso enc pt to comply with nutrition recommendations  - Roegr / Ildefonso enc Participation in support group meetings  - Roger Fregoso enc pt to go back to maintenance of food records and weight monitoring records   - Roger Fregoso reviewed the importance of adequate sleep and stress management  - Roger Fregoso reviewed nonfood strategies to cope with emotions and stress  - Roger Fregoso encouraged pt to practice the following: Mindful eating: Eating slowly: Focusing   on the eating experience without distraction  - Roger Fregoso enc. pt to pay attention to hunger and fullness  - Rd / Ld enc meal planning  - Roger / Arnoldo Grain pt to chose nutrient dense whole foods instead of soft, high calorie foods  - Roger / Ildefonso enc not dr De Paz Long large amounts of fluids with or immediately after meals    Portion control ,meal planning and avoiding empty calorie consumption. Roger / Ildefonso reviewed insurance company weight loss requirement on 1/11/22. Pt verbalized understanding. Please be aware at each visit you have been instructed that in order for your insurance company to approve your surgery you must show a consistent weight loss of 2 lbs or greater at each visit.  We can not guarantee an approval by your insurance company we can only provide the information given to us it is up to you the patient to show compliancy to your insurance company. If you do gain weight during your supervised weight loss counseling sessions insurance companies starting in 2018 are denying patients for not showing consistent weight loss results when part of a supervised weight loss counseling program.       The following education was reviewed:  [facility]  Supplement Guidelines for   Bariatric Surgery        Listed Below are the recommended supplements for Christ-en-Y Gastric By-pass Surgery or laparoscopic Sleeve Gastrectomy. Please be aware you must purchase a 3 month supply of these supplements within the next month after attending your nutrition class and bring these to your History and Physical Appointment.      Approved supplements for use after Christ-en-Y Gastric Bypass Surgery    Vitamins - Select One for usage after surgery:  (a) Optisource Chewable Multi-Vitamin (1 tablet 4 times daily)    (b) Bariatric Fusion Chewable Complete Multi-Vitamin (1 tablet 4 times daily)    (c) Celebrate Chewable Multi-Vitamin (1 tablet 2 times daily) - and -  Celebrate Iron 30 mg + C (1 tablet daily)    (d) Bariatric Advantage Chewable Multi-Formula (1 tablet 2 times daily) - and -   Bariatric Advantage Chewable Iron 29 mg (1 tablet daily)      Calcium - Select One for usage after surgery:  (a) Citracal with Vitamin D - 315 mg calcium citrate per tablet, 250 iu Vitamin D per tablet (1 tablet 5 times daily) - Dissolved in Water    (b) Citracal with Vitamin D Petities - 200 mg calcium citrate per tablet, 250 iu Vitamin D per tablet (2 tablets 4 times daily) - Dissolved in Water    (c) Bariatric Advantage Calcium Lozenges Chewable (1 tablet 3 times daily)    (d) Bariatric Advantage Calcium Chews (1 tablet 6 times daily) - contains calories    (e) Celebrate Calcium Plus 500 (1 tablet 3 times daily)    (f) Calcet Creamy Bites (1 tablet 3 times daily) - contains calories      Laparoscopic Sleeve Gastectomy patients will be required to take Vitamin B12 500 mcgs 1 tablet daily in addition to the supplements listed above      Suggested Post-Operative Vitamin Supplementation Information:   A high-potency vitamin containing at least 200% of the daily value for at least 2/3 of the nutrients.  Begin with chewable or liquid vitamins for the first three months.  Caution liquid vitamins containing iron can stain teeth.  Avoid time-released supplements.  Avoid gel capsules.  Avoid enteric coating.  Choose a complete formula with at least 18 mg of iron, 012CN of folic acid, and contains selenium and zinc in each serving.  Avoid childrens formulas that are incomplete.  Taking vitamins close to food intake may improve gastrointestinal tolerance.  Do separate your dosage of vitamins when taking throughout the day.  Do not mix multivitamin containing iron with calcium supplements, take at least 2 hours apart. Suggested Post-Operative Calcium Supplementation Information:  Calcium supplements are needed due to the great amount of malabsorption that occurs after surgery which, can lead to developing osteoporosis or osteopenia within the first 2 years post-op. A calcium supplement will help maintain bone strength, help your heart pump correctly and aid in the repair of soft tissue. Calcium supplementation is needed lifelong. It is best to always take one dose right before bedtime because calcium is absorbed better when at rest, and another dose around 10:00am and another does around 3:00pm.     ? If you have a predisposition to kidney stones, please talk with your dietitian. Calcium Citrate should block kidney stone formation so this should not be a problem after surgery. If you do not take the correct form of calcium or take the wrong calcium you are more likely to develop kidney stones.     ? Make sure your calcium supplement contains Vitamin D3 at least 800 -1000 iu daily. Spilt calcium into 500-600 mg doses; be mindful of serving size on supplement label. Space doses evenly throughout the day. ? If you are post menopausal and on hormone replacement therapy, please see your dietitian for calcium recommendations. ? If you currently have osteoporosis or osteopenia and are being treated medically (e.g. Actonel, Fosamax), please see your dietitian for calcium recommendations. ? Do not take any of your Calcium within 2 hours of other medications or iron containing multi-vitamins because it may interfere with absorption of the medication. ? Calcium supplements are not a replacement for calcium found within your food. We also recommend in addition to your calcium supplement at least 3 servings of low-fat dairy total daily to meet your calcium requirements. Combined calcium from food and calcium from calcium supplements intake should be greater than 1700 mg. total daily to prevent bone loss during rapid weight loss phase. ? Stay away from Calcium Carbonate. I can not stress this enough after surgery. It is Calcium Citrate we want all patients taking. Do not let supplement shops talk you into something that we do not recommend. It is important in your surgical outcome and overall medical care. ? We Do Not recommend the new Calcium Citrate Crystals  - These are not absorbed well after surgery because they are mainly Calcium Lactate-Gluconate. They are not Calcium Citrate. The company does market them as Calcium Citrate because Calcium Lactate- Gluconate has the same bioavailability as Calcium Citrate but can be made into a drink form. Store Location:  [employer], [employer], [employer], [employer] or by calling [phone] or on-line at wwwCurio. Bariatric Advantage   [phone] or on-line at www.bariatricadvantage. Yumber  Bariatric Fusion   [phone] or on-line at www.bariatricfusion. com  Celebrate    [phone] or on-line at [email or web address]. UpTap                                                            Types of Protein Supplements:    Whey Protein:   Is the highest quality of protein available. It is a rich source of Branched Chain Amino Acids containing the highest known levels of any natural food source. Types of Whey Protein:  Whey Protein Isolate -  Most amount of protein per serving and   literally zero carbohydrates, lactose and fat. 90% Protein. Highly recommended. Whey Protein Blends -  Blend Whey Protein Concentrate and whey   protein isolate to make a good quality   protein. Do not recommend due to trace     amounts of heavy metals present. Whey Protein Concentrate - cheapest , contain high levels of fat and        lactose ( Not good if you are Lactose        Intolerant) 75% Protein. Do ot recommend      due to trace amounts of heavy metals    present. Casein Protein:  Micellar Casein is a slow digesting and rich protein source that continues to feed your muscles long after whey proteins have dropped off. ( More for Body Builders not a good protein for bariatric patients due to it taking up to seven hours to fully digest)    Egg Protein:  Egg Protein is made from pure egg whites. It is fat-free, fast digesting and rich in Branched Chain Amino Acids and Glutamic Acid. 100% protein. Soy Protein:  Fast digesting form of protein with a solid Branched chain Amino Acid profile. Made from non-animal sources, ideal for Lactose Intolerant and Vegans! Avoid Soy-Protein Isolate Powders. Recommend soy protein without added hormones. Collagen Protein:  Is the component of human connective tissue found in skin, hair and nails and has no Essential Amino Acids to help build protein levels. Avoid. Protein Supplement Drinks Comparisons    We do not endorse these stores or products.  These are only to help you with your lifestyle changes. (All serving sizes are 1 scoop, 1 can or 1 packet)  Product Source Cost  (approx.) Calories Fat  (grams) Protein  (grams) Sugars  (grams)              Guy Mai - Whey Protein         The [employer] Shoppe $45.00 110 0 25 0   Ángela Stephens - Egg Protein [employer] $25.00 120 0 24 0   Iso Pure Powder [employer]NC / The  Vitamin  Shoppe $ 40.00 100-105 0 25 0   Nectar Protein The  Vitamin  Shoppe $ 30.00 80 0 23 0   BeneProtein [address] $ 42.00     case of 6 25 0 6 0   ProCel and UpCal D  Plus Phone order  [phone] $ 50.00  case of 6 28 0 5 0-1     Bariatric Advantage [phone]   $50.00 150 1.5 - 2.0 27 .5   Bariatric Fusion [phone] $35.00 138 0 27 <1   WheyBolic Extreme 60  GNC $ 45.00 90 0.5 20 0.5   [employer]est [employer] $35.00 138 0 27 <1   When choosing a supplement: Do not consume Ensure, Glycerna, Boost or any other high-calorie nutrition supplement on the market. We recommend that the fat content of your protein drink be less than 2 grams of fat, less than 2 grams of sugar and that it be 200 or less calories per serving. We also recommend that you are able to consume enough of your supplement to get your daily protein intake of 60-80 grams. If you are having difficulty getting your protein or are not able to find a supplement, please call your dietitian at [phone].

## 2022-02-08 ENCOUNTER — INITIAL CONSULT (OUTPATIENT)
Dept: BARIATRICS/WEIGHT MGMT | Age: 52
End: 2022-02-08

## 2022-02-08 VITALS — BODY MASS INDEX: 41.59 KG/M2 | HEIGHT: 67 IN | WEIGHT: 265 LBS

## 2022-02-08 DIAGNOSIS — E66.01 MORBID OBESITY DUE TO EXCESS CALORIES (HCC): ICD-10-CM

## 2022-02-08 DIAGNOSIS — Z71.3 DIETARY COUNSELING: Primary | ICD-10-CM

## 2022-02-08 PROCEDURE — 99999 PR OFFICE/OUTPT VISIT,PROCEDURE ONLY: CPT

## 2022-02-08 NOTE — PROGRESS NOTES
WEIGHT:  265 lb (120.2 kg)    Pt was in th office for his/her 3rd weight Loss appointment. Pt is aware he/she must meet his/her pre-op weight loss goal in order to proceed with weight loss surgery. Roger / Ildefonso faxed a copy of what was reviewed with the pt to her PCP. Pt verbalized understanding. Roger// Ildefonso enc the following at today's visit for successful post-surgical Weight Maintenance    1. Weigh yourself daily and record it. 2. Keep documented food records daily   3. Just be more active in day to day routine   4. Higher protein intake and a higher fiber intake. Not a high protein or a high fiber diet just a higher intake. 5.Eliminate empty calorie consumption    Roger Fregoso addressed the following with the pt:  - Rd  Ld enc pt to comply with nutrition recommendations  - Roger / Ld enc Participation in support group meetings  - Rd Ld enc pt to go back to maintenance of food records and weight monitoring records   - Roger Fregoso reviewed the importance of adequate sleep and stress management  - Roger Fregoso reviewed nonfood strategies to cope with emotions and stress  - Roger Fregoso encouraged pt to practice the following: Mindful eating: Eating slowly: Focusing   on the eating experience without distraction  - Roger Fregoso enc. pt to pay attention to hunger and fullness  - Roger / Ld enc meal planning  - Roger Cavazos pt to chose nutrient dense whole foods instead of soft, high calorie foods  - Roger / Ildefonso sotelo not dr Chandra Garcia large amounts of fluids with or immediately after meals    Portion control ,meal planning and avoiding empty calorie consumption. Roger / Ildefonso reviewed insurance company weight loss requirement on 2/8/22. Pt verbalized understanding. Please be aware at each visit you have been instructed that in order for your insurance company to approve your surgery you must show a consistent weight loss of 2 lbs or greater at each visit.  We can not guarantee an approval by your insurance company we can only provide the information given to us it is up to you the patient to show compliancy to your insurance company.   If you do gain weight during your supervised weight loss counseling sessions insurance companies starting in 2018 are denying patients for not showing consistent weight loss results when part of a supervised weight loss counseling program.

## 2022-03-01 ENCOUNTER — TELEPHONE (OUTPATIENT)
Dept: BARIATRICS/WEIGHT MGMT | Age: 52
End: 2022-03-01

## 2022-03-01 ENCOUNTER — INITIAL CONSULT (OUTPATIENT)
Dept: BARIATRICS/WEIGHT MGMT | Age: 52
End: 2022-03-01

## 2022-03-01 VITALS — WEIGHT: 259 LBS | HEIGHT: 67 IN | BODY MASS INDEX: 40.65 KG/M2

## 2022-03-01 DIAGNOSIS — Z02.6 ENCOUNTER FOR EXAMINATION FOR INSURANCE PURPOSES: ICD-10-CM

## 2022-03-01 DIAGNOSIS — Z00.8 NUTRITIONAL ASSESSMENT: Primary | ICD-10-CM

## 2022-03-01 DIAGNOSIS — Z71.3 NUTRITIONAL COUNSELING: ICD-10-CM

## 2022-03-01 DIAGNOSIS — Z78.9 NONSMOKER: Primary | ICD-10-CM

## 2022-03-01 PROCEDURE — 99999 PR OFFICE/OUTPT VISIT,PROCEDURE ONLY: CPT | Performed by: DIETITIAN, REGISTERED

## 2022-03-01 NOTE — TELEPHONE ENCOUNTER
Last Dietary Appointment Notes: 3/1/22    April 67: [address]    Surgery Requested by Patient: As of 3/1/22 - RYGB     Date: 2 Hour Nutrition Class: Once all testing is complete    Rd / Ld reviewed the following with the patient:    Rd / Ld at the [facility] reviewed with the patient that he / she has not completed the following in order to proceed with bariatric surgery:     - Initial Appt with Surgeon was 11/3/21. Initial Appt is only good until 11/3/22. Testing will be required again after this date per your insurance company policy. Please remember just because you finished all of your requirements if you did not finish the requirements in a timely manner they can  and you can be required to complete these requirements over again. Each [employer] has its own set of requirements with its own set of deadlines. Once everything listed below is completed you will need to complete the following to proceed with sx. The [facility] will contact you to complete this process. 1. You will be called in order to select your surgery date for insurance submission. 2. You will be called and scheduled to attend a 3 Hour Nutrition Class on the type of surgery you are having completed. These are always scheduled on  from 10:00 am to 1:00 pm and dates vary depending on the type of surgery you are having completed. You will need to purchase your bariatric supplements at this appointment cost is $240.00 to $290.00. Failure to purchase supplements or attend the class will lead to your surgery being cancelled. 3. You will need final Medical Clearance from your Primary Care Physician. Failure to complete will lead to your surgery being cancelled. 4. You will be scheduled for a H&P appointment with your surgeon . It is at this appointment you will need to make goal weight. Failure to complete will lead to your surgery being cancelled.     5. You will be scheduled for PAT at [address] [place] [address] Hospital usually the week before your scheduled surgery. Failure to complete will lead to your surgery being cancelled. Remember after all testing that is required it is your responsibility as a patient to call [facility] to review that we received any testing results or requirements that you had completed. The [facility] is not responsible for tracking of results and testing. Your phone call will help facilitate if what is required was received and completed. - Medical Clearance from PCP first - Chart to [facility] [address] has [address] No Specialist no PMH No DX  - Nicotine Script Given 3/1/22  // Draw Date ASAP // Pt instructed to call 10 day's after to review results. - Vitamin B1 - Def - Lab Recheck - Script Given 3/1/22 - Pt to have drawn ASAP. Pt instructed to call 10 day's after to review results. - Pt is going to call and schedule her Psych Evaluation. Rd / Ld at the [facility] reviewed with the patient that he / she is at his / her goal weight for surgery and can not gain weight from now until surgery:  Yes. Patient is aware weight gain at H&P will cancel the patients surgery date. Pre-Op Weight Loss Goal: 259 lbs. Pt weighs 259 lbs. Rd / Ld reviewed with the patient that he / she must purchase a 3 month supply of supplements before his / her surgery or at the time of his / her H&P appointment and the patient states he / she is going to purchase the 3 month supply of supplements on H&P. Patient is aware that failure to purchase the supplements at this appointment will cancel the patients surgery date. Patient states at this time from the time of his / her initial consult here at the [facility] there has been no changes in his / her medical history. Patient is aware failure to disclose information can lead to his / her surgery being cancelled. Patient received a copy of this at the time of his / her final dietary consult.

## 2022-03-01 NOTE — PROGRESS NOTES
Weight Loss Assessment: Completed by Nutrition Services RD/ERROL Certified in Adult Weight Management:  Phone Number:  [phone]  Fax Number:   [phone] [facility]   3/1/22  Weight Loss Appointment: 4th Weight Loss Appointment      Wt Readings from Last 3 Encounters:   03/01/22 259 lb (117.5 kg)   02/08/22 265 lb (120.2 kg)   01/11/22 277 lb (125.6 kg)        259 lb (117.5 kg)  IBW: 163 lbs         % IBW: 159%       % EBWL: 9%           ABW: 187 lbs  % ABW: 139%       BMI: Body mass index is 40.57 kg/m². Patient's 24 Hour Recall:  Breakfast: 16 - 20 oz Water, Egg with Pepper and [place], 2 [employer] [facility] [place]) [place], [employer] of [place] with [employer] and [employer]: 1/2 Grapefruit  Lunch: Grilled Chicken Fruit Salad - Small Serving, [place], 2 Strawberries, Salad -  Onion, Peppers and [employer], Apple and Water = 20 oz  Snack: [place] [employer] Uns [place] Mix  Dinner: Saloon or [place]  with Asparagus, [address] Sprouts or [place], Small Salad - Onions, Green Pepper, [employer] Pepper - 24  - 32 oz Water  Snack: 1/2 Orange, 1 cup of Grapes or cantaloupe, Couple Sunflower Seeds. Water Intake: 64 oz Plus  Other Beverages:   Exercise: ADL's - 4 to 5 Day's A Week - High Impact Aerobics - 2 Day's A Week with Weight Training 2 Day's A Week - 45 - 60 Minutes     Education:     1. Weigh yourself daily and record it. 2. Keep documented food records daily   3. 220-225 minutes a week of moderate physical activity   4. Just be more active in day to day routine   5. Higher protein intake and a higher fiber intake. Not a high protein or a high fiber diet just a higher intake.     Roger Fregoso addressed the following with the pt:  - Roger Fregoso enc pt to comply with nutrition recommendations  - Roger Fregoso enc to go back to maintenance of regular physical activity  - Periodic assessment to prevent and treat eating or other psychiatric disorders   - Roger / Errol enc participation in support group meetings  - Rd Ld enc pt to go back to maintenance of daily food records and weight monitoring records   - Rd Ld reviewed the importance of adequate sleep and stress management  - Rd Ld reviewed nonfood strategies to cope with emotions and stress  - Rd Ld encouraged pt to practice the following: Mindful eating: Eating slowly: Focusing on the eating experience without distraction  - Rd Ld enc. pt to pay attention to hunger and fullness cues  - Rd / Ld enc meal planning  - Rd / Ld  enc pt to chose nutrient dense whole foods instead of soft, high calorie foods  - Rd / Ld enc not drinking large amounts of fluids with or immediately after meals    Portion control, meal planning and avoiding empty calorie consumption. Weight loss goal for next follow-up appointment: 25 - 30 lbs    Patient has established the following three goals for the next follow-up appointment. 1. Pt states she wants to continue to keep protein foods within her diet. 2. Pt states she wants to increase water intake. Pt sutherland snot want to force water intake. Patient was instructed on the importance of increasing water intake to 48 - 64 oz. of water total daily. Pt. was also instructed he / she is allowed an additional 30 oz. of sugar free caffeine free clear liquid beverages for a total of 90 oz. of fluid total daily. Pt. was able to verbalize how he / she can get more water and fluids within the diet. Pt. verbalized understanding. 3.Pt wants to continue with exercise and PA. Patient has established the following exercise goal for next follow-up appointment:   ADL's - 4 to 5 Day's A Week - High Impact Aerobics - 2 Day's A Week with Weight Training 2 Day's A Week - 39 - 60 Minutes     Did the patient keep food records:Yes - Pt journals her food and takes pictures of her food.      Pt. is aware if they do not comply with [facility] Guidelines that this can lead to the patient being dismissed from the program.    The registered dietitian spent the following time 60 minutes educating the patient and providing the patient with nutritional handouts to follow. __________________________________________________________________________________  Primary Care Physician Follow-up:  Pt. was seen by Sayra Fair RD/ERROL regarding weight loss education and follow-up on 3/1/22. This was the patients 4th appointment with the registered dietitian. The registered dietitian spent the following amount of time with the patient 60 minutes. Please [place] the following: The Primary Care Physician reviewed the above nutrition assessment and patient education and agrees with current diet plan. The Primary Care Physician wants the current diet plan changed to the following:_____________________________________________________________________________________________________________________________________________________________________________________________________________. Physician Signature:__________________________ Date:______________  Once signed please fax back to the [facility] [phone]. We thank you for allowing us to participate in your patients care.

## 2022-03-02 ENCOUNTER — TELEPHONE (OUTPATIENT)
Dept: BARIATRICS/WEIGHT MGMT | Age: 52
End: 2022-03-02

## 2022-03-02 NOTE — TELEPHONE ENCOUNTER
Preparing for [employer]. Patient has [address] and requires medical clearance prior to submission. Faxed request to PCP today. Called patient to that the clearance request has been faxed, and to call office to see if appt is needed in order to obtain medical clearance. She voiced understanding.

## 2022-03-02 NOTE — LETTER
Medical Clearance / Medical Necessity for Christ-en-Y Gastric Bypass    Name: [facility]                                     YOB: 1970    I have been caring for the above patient for _____ years. He/she suffers from the following medical conditions:  __________________________________________________________________________________________________________________________________________________________________________________________________________________    The above medical conditions are either caused by or worsened by the patients morbid obesity. Yes_________ No__________    The above medical conditions are currently stable:      Yes_________ No__________    The following medical conditions are difficult to manage/require multiple medications to achieve control due to the patients weight: ______________________________________________________________________  ______________________________________________________________________                    The above patient has participated in the following medical weight loss efforts without long-term weight reduction:  ____________________________________________________________________________________________________________________________________________    I am in support of my patient undergoing a weight loss surgical procedure with [address] [place] [address] [facility] and the patient understands the risks and benefits of weight loss surgery. The patient has reasonable expectations and I believe the patient will be compliant with all post-surgical requirements. I understand the program is comprehensive with dedicated and specially trained staff.  In the event that my patient is over the age of 61, I would like to state that the patients physiological age and co-morbid conditions result in a positive risk to benefit ratio.        ________  In my medical opinion, there are no medical contraindications to proceed with gastric bypass surgery and the patients benefits of surgery outweigh the risks of surgery.       Physician Name (Please Print): __________________________________    Primary Care Physicians Signature: __________________________________    Date: ______/______/______

## 2022-03-02 NOTE — LETTER
Date: 3-2-2022    [address]:  Attention Prior Authorization    Regarding:  [facility]  Member ID:  [id number]    Request:     Authorization for CPT 61760  (Laparoscopic Christ-en-Y)                      Diagnosis Codes:  E66.01    Physician: Gamaliel Baum M.D.  ([id number] [facility] TIN [id number])           (NPI [id number])    Facility: [address] [place] [address] [id number] (NPI [id number])    E[address]     Dear Bro Benz or :     Pre-determination of insurance coverage, and authorization for hospitalization and surgical treatment are requested on behalf of your [address] [place] [address] for diagnoses of morbid obesity. Carrie Owens is 5'7, weighs 259 lb., and has a BMI of 40.5. She has been severely obese for a number of years despite many years of dietary efforts. She has lost weight through these efforts, however has been unable to maintain satisfactory weight loss. Aniket Soto has been evaluated in our bariatric program and is felt to be an excellent candidate  for surgery. The patient has undergone extensive pre-operative education and understands all the risks, benefits and possible complications of surgery. She has also undergone thorough nutritional evaluation and counseling with our registered dietician. Our program provides long term nutritional counseling with unlimited consults with the dietician. All female patients have been educated on the importance of using reliable birth control and avoiding pregnancy for the first 2 years following bariatric surgery. All patients are nicotine tested and require a negative nicotine level prior to surgery. All patients are counseled on the importance of remaining nicotine free after surgery, as well as avoiding drug and alcohol use for life after bariatric surgery.      I am requesting authorization for Laparoscopic Christ-en-Y Gastric Bypass, procedure code 71191, with a [facility] stay of 1 day, to be performed for the treatment of the patients severe and life threatening diseases. This procedure will be performed at [address] [place] [address], [address]. I appreciate your consideration in this matter and your timely response. Supporting clinical documents are included with this request.    Electronically signed by Dr. Trevin Christianson M.D.   Bariatric Surgery

## 2022-03-03 ENCOUNTER — HOSPITAL ENCOUNTER (OUTPATIENT)
Age: 52
Discharge: HOME | End: 2022-03-03
Payer: COMMERCIAL

## 2022-03-03 DIAGNOSIS — Z78.9 NONSMOKER: ICD-10-CM

## 2022-03-03 DIAGNOSIS — E51.9 VITAMIN B1 DEFICIENCY: ICD-10-CM

## 2022-03-03 DIAGNOSIS — Z02.6 ENCOUNTER FOR EXAMINATION FOR INSURANCE PURPOSES: ICD-10-CM

## 2022-03-03 DIAGNOSIS — E66.01 MORBID OBESITY DUE TO EXCESS CALORIES (HCC): ICD-10-CM

## 2022-03-03 PROCEDURE — 84425 ASSAY OF VITAMIN B-1: CPT

## 2022-03-03 PROCEDURE — G0480 DRUG TEST DEF 1-7 CLASSES: HCPCS

## 2022-03-07 LAB
3-OH-COTININE: <2 NG/ML
COTININE: <2 NG/ML
NICOTINE: <2 NG/ML

## 2022-03-10 LAB — VITAMIN B1 WHOLE BLOOD: 105 NMOL/L (ref 70–180)

## 2022-03-30 ENCOUNTER — TELEPHONE (OUTPATIENT)
Dept: BARIATRICS/WEIGHT MGMT | Age: 52
End: 2022-03-30

## 2022-03-30 NOTE — TELEPHONE ENCOUNTER
Medical clearance received. Case prepared and [facility] with request for LRYGB 5-3-2022. Online confirmation received and patient notified of insurance submission.

## 2022-04-05 ENCOUNTER — SCHEDULED TELEPHONE ENCOUNTER (OUTPATIENT)
Dept: BARIATRICS/WEIGHT MGMT | Age: 52
End: 2022-04-05

## 2022-04-05 DIAGNOSIS — Z71.3 NUTRITIONAL COUNSELING: ICD-10-CM

## 2022-04-05 DIAGNOSIS — Z00.8 NUTRITIONAL ASSESSMENT: Primary | ICD-10-CM

## 2022-04-05 PROCEDURE — 99999 PR OFFICE/OUTPT VISIT,PROCEDURE ONLY: CPT | Performed by: SURGERY

## 2022-04-05 NOTE — PROGRESS NOTES
Roger Fregoso called the pt and scheduled the pt for the following. Pt is aware he/she needs to be down to their pre-op weight loss goal when they come in for their weight check or their sx will be cx. Pt verbalized understanding. Low fat diet reviewed. Pre-op weight loss goal reviewed. Pt scheduled for the following see below. Pt is aware supplements are cash, check, credit or debt card. SX Type: RYGB  SX Date: 5/3/22  H&P Appt: Dr. Mónica Lock ???  Weight Check Appt: This will occur after the 3 hour class at the [facility]  3 Hour Class: At the [facility] From 10:00 - 1:00 pm on - 4/27/22 - RYGB Class  Supplements: Pt needs  2 Hour Pt Education Book: Pt needs  ASMBS Change in Pain Medication Education Book: Pt needs    Pt was instructed he / she can call any time with questions. Goal Weight 259 lbs - Pt has copy of pre-op diet. Enc pt to follow pre-op diet to get down to pre-op weight loss goal. Pt verbalized understanding.   Pt is aware sx can be cx by her surgeon at H&P appt if he feels pt has not achieved pre-op weight loss goal.

## 2022-04-06 ENCOUNTER — TELEPHONE (OUTPATIENT)
Dept: BARIATRICS/WEIGHT MGMT | Age: 52
End: 2022-04-06

## 2022-04-06 NOTE — TELEPHONE ENCOUNTER
Per order of Dr. Malvin Siemens patient is ready to be scheduled for LRYGB. Call placed to patient and she is in agreement with surgery on 5/3/22. Pre-op class 4/27/22 scheduled and patient knows she will need to purchase supplements at that visit. Pre-op letter will be mailed. She is working towards pre-op weight loss goal.   She does not smoke and will refrain from alcohol use. We reviewed at length all meds to avoid 2 weeks prior to surgery and patient voiced understanding. This list is in the pre-op letter which will be mailed to patient. Patient denies PONV  She does not use a CPAP   She has final medical clearance. Patient placed on [employer]. Patient placed in [employer].

## 2022-04-06 NOTE — TELEPHONE ENCOUNTER
LA paperwork completed and signed by Dr. Maryann Lockhart. Patient will pick them up this afternoon.

## 2022-04-07 ENCOUNTER — PREP FOR PROCEDURE (OUTPATIENT)
Dept: SURGERY | Age: 52
End: 2022-04-07

## 2022-04-07 RX ORDER — SODIUM CHLORIDE, SODIUM LACTATE, POTASSIUM CHLORIDE, CALCIUM CHLORIDE 600; 310; 30; 20 MG/100ML; MG/100ML; MG/100ML; MG/100ML
INJECTION, SOLUTION INTRAVENOUS CONTINUOUS
Status: CANCELLED | OUTPATIENT
Start: 2022-04-07

## 2022-04-07 RX ORDER — SODIUM CHLORIDE 0.9 % (FLUSH) 0.9 %
5-40 SYRINGE (ML) INJECTION EVERY 12 HOURS SCHEDULED
Status: CANCELLED | OUTPATIENT
Start: 2022-04-07

## 2022-04-07 RX ORDER — SODIUM CHLORIDE 0.9 % (FLUSH) 0.9 %
5-40 SYRINGE (ML) INJECTION PRN
Status: CANCELLED | OUTPATIENT
Start: 2022-04-07

## 2022-04-07 RX ORDER — SCOLOPAMINE TRANSDERMAL SYSTEM 1 MG/1
1 PATCH, EXTENDED RELEASE TRANSDERMAL
Status: CANCELLED | OUTPATIENT
Start: 2022-04-07 | End: 2022-04-10

## 2022-04-07 RX ORDER — SODIUM CHLORIDE 9 MG/ML
25 INJECTION, SOLUTION INTRAVENOUS PRN
Status: CANCELLED | OUTPATIENT
Start: 2022-04-07

## 2022-04-07 RX ORDER — ONDANSETRON 2 MG/ML
4 INJECTION INTRAMUSCULAR; INTRAVENOUS ONCE
Status: CANCELLED | OUTPATIENT
Start: 2022-04-07 | End: 2022-04-07

## 2022-04-27 ENCOUNTER — INITIAL CONSULT (OUTPATIENT)
Dept: BARIATRICS/WEIGHT MGMT | Age: 52
End: 2022-04-27

## 2022-04-27 ENCOUNTER — OFFICE VISIT (OUTPATIENT)
Dept: BARIATRICS/WEIGHT MGMT | Age: 52
End: 2022-04-27
Payer: COMMERCIAL

## 2022-04-27 ENCOUNTER — HOSPITAL ENCOUNTER (OUTPATIENT)
Dept: PREADMISSION TESTING | Age: 52
Discharge: HOME | End: 2022-04-27
Payer: COMMERCIAL

## 2022-04-27 VITALS
HEART RATE: 88 BPM | TEMPERATURE: 97.4 F | DIASTOLIC BLOOD PRESSURE: 100 MMHG | RESPIRATION RATE: 20 BRPM | WEIGHT: 255 LBS | BODY MASS INDEX: 40.02 KG/M2 | SYSTOLIC BLOOD PRESSURE: 166 MMHG | HEIGHT: 67 IN

## 2022-04-27 VITALS
SYSTOLIC BLOOD PRESSURE: 144 MMHG | HEIGHT: 67 IN | WEIGHT: 256 LBS | TEMPERATURE: 96.8 F | DIASTOLIC BLOOD PRESSURE: 90 MMHG | HEART RATE: 69 BPM | RESPIRATION RATE: 16 BRPM | BODY MASS INDEX: 40.18 KG/M2

## 2022-04-27 DIAGNOSIS — Z01.818 PRE-OP TESTING: ICD-10-CM

## 2022-04-27 DIAGNOSIS — Z98.890 PONV (POSTOPERATIVE NAUSEA AND VOMITING): ICD-10-CM

## 2022-04-27 DIAGNOSIS — K91.2 MALNUTRITION FOLLOWING GASTROINTESTINAL SURGERY: Primary | ICD-10-CM

## 2022-04-27 DIAGNOSIS — K21.9 GASTROESOPHAGEAL REFLUX DISEASE WITHOUT ESOPHAGITIS: ICD-10-CM

## 2022-04-27 DIAGNOSIS — Z71.3 NUTRITIONAL COUNSELING: ICD-10-CM

## 2022-04-27 DIAGNOSIS — E66.01 MORBID OBESITY (HCC): Primary | ICD-10-CM

## 2022-04-27 DIAGNOSIS — R11.2 PONV (POSTOPERATIVE NAUSEA AND VOMITING): ICD-10-CM

## 2022-04-27 DIAGNOSIS — Z00.8 NUTRITIONAL ASSESSMENT: Primary | ICD-10-CM

## 2022-04-27 LAB
ALBUMIN SERPL-MCNC: 4.3 G/DL (ref 3.5–5.2)
ALP BLD-CCNC: 110 U/L (ref 35–104)
ALT SERPL-CCNC: 15 U/L (ref 0–32)
ANION GAP SERPL CALCULATED.3IONS-SCNC: 13 MMOL/L (ref 7–16)
AST SERPL-CCNC: 22 U/L (ref 0–31)
BILIRUB SERPL-MCNC: 0.5 MG/DL (ref 0–1.2)
BUN BLDV-MCNC: 9 MG/DL (ref 6–20)
CALCIUM SERPL-MCNC: 9.6 MG/DL (ref 8.6–10.2)
CHLORIDE BLD-SCNC: 102 MMOL/L (ref 98–107)
CO2: 25 MMOL/L (ref 22–29)
CREAT SERPL-MCNC: 0.9 MG/DL (ref 0.5–1)
GFR AFRICAN AMERICAN: >60
GFR NON-AFRICAN AMERICAN: >60 ML/MIN/1.73
GLUCOSE BLD-MCNC: 83 MG/DL (ref 74–99)
HCT VFR BLD CALC: 39.6 % (ref 34–48)
HEMOGLOBIN: 12.7 G/DL (ref 11.5–15.5)
MCH RBC QN AUTO: 28.8 PG (ref 26–35)
MCHC RBC AUTO-ENTMCNC: 32.1 % (ref 32–34.5)
MCV RBC AUTO: 89.8 FL (ref 80–99.9)
PDW BLD-RTO: 13 FL (ref 11.5–15)
PLATELET # BLD: 338 E9/L (ref 130–450)
PMV BLD AUTO: 9.7 FL (ref 7–12)
POTASSIUM SERPL-SCNC: 3.9 MMOL/L (ref 3.5–5)
RBC # BLD: 4.41 E12/L (ref 3.5–5.5)
SODIUM BLD-SCNC: 140 MMOL/L (ref 132–146)
TOTAL PROTEIN: 7.8 G/DL (ref 6.4–8.3)
WBC # BLD: 4.3 E9/L (ref 4.5–11.5)

## 2022-04-27 PROCEDURE — 1036F TOBACCO NON-USER: CPT | Performed by: SURGERY

## 2022-04-27 PROCEDURE — G8427 DOCREV CUR MEDS BY ELIG CLIN: HCPCS | Performed by: SURGERY

## 2022-04-27 PROCEDURE — 99214 OFFICE O/P EST MOD 30 MIN: CPT | Performed by: SURGERY

## 2022-04-27 PROCEDURE — 80053 COMPREHEN METABOLIC PANEL: CPT

## 2022-04-27 PROCEDURE — 85027 COMPLETE CBC AUTOMATED: CPT

## 2022-04-27 PROCEDURE — 36415 COLL VENOUS BLD VENIPUNCTURE: CPT

## 2022-04-27 PROCEDURE — 99999 PR OFFICE/OUTPT VISIT,PROCEDURE ONLY: CPT | Performed by: DIETITIAN, REGISTERED

## 2022-04-27 PROCEDURE — G8417 CALC BMI ABV UP PARAM F/U: HCPCS | Performed by: SURGERY

## 2022-04-27 PROCEDURE — 99213 OFFICE O/P EST LOW 20 MIN: CPT

## 2022-04-27 PROCEDURE — 3017F COLORECTAL CA SCREEN DOC REV: CPT | Performed by: SURGERY

## 2022-04-27 RX ORDER — ONDANSETRON 4 MG/1
4 TABLET, ORALLY DISINTEGRATING ORAL EVERY 8 HOURS PRN
Qty: 15 TABLET | Refills: 0 | Status: SHIPPED | OUTPATIENT
Start: 2022-04-27

## 2022-04-27 RX ORDER — OMEPRAZOLE 20 MG/1
20 CAPSULE, DELAYED RELEASE ORAL DAILY
Qty: 30 CAPSULE | Refills: 12 | Status: SHIPPED
Start: 2022-04-27 | End: 2022-06-02 | Stop reason: SDUPTHER

## 2022-04-27 NOTE — PROGRESS NOTES
[facility]                                                                                                                    PRE OP INSTRUCTIONS FOR  Tena Owens        Date: 4/27/2022    Date of surgery: 5/3/22   Arrival Time: Hospital will call you between 5pm and 7pm with your final arrival time for surgery    1. Do not eat or drink anything after midnight prior to surgery. This includes no water, chewing gum, mints or ice chips. 2. Gastric bypass patients- The night before surgery drink 2- 24 oz bottles of regular Gatorade at bedtime. Drink both within 10-15 minutes. (Insulin dependent patients drink 2- 24 oz bottles of sugar free Gatorade.) May have liquids up to 6 hours prior to surgery. 3. Take the following medications with a small sip of water on the morning of Surgery: flonase     4. Aspirin, Ibuprofen, Advil, Naproxen, Vitamin E and other Anti-inflammatory products should be stopped  before surgery  as directed by your physician. Take Tylenol only unless instructed otherwise by your surgeon. 5. Do not smoke,use illicit drugs and do not drink any alcoholic beverages 24 hours prior to surgery. 6. You may brush your teeth the morning of surgery. DO NOT SWALLOW WATER    7. You MUST make arrangements for a responsible adult to take you home after your surgery. You will not be allowed to leave alone or drive yourself home. It is strongly suggested someone stay with you the first 24 hrs. Your surgery will be cancelled if you do not have a ride home. 8. Please wear simple, loose fitting clothing to the hospital.  Raysa Primus not bring valuables (money, credit cards, checkbooks, etc.) Do not wear any makeup (including no eye makeup) or nail polish on your fingers or toes. 9. DO NOT wear any jewelry or piercings on day of surgery. All body piercing jewelry must be removed. 10.  Shower the night before surgery with _x__Antibacterial soap [employer] WIPES________    11. If appropriate bring crutches, inspirex, WALKER, CANE etc...    12. Notify your Surgeon if you develop any illness between now and surgery time, cough, cold, fever, sore throat, nausea, vomiting, etc.  Please notify your surgeon if you experience dizziness, shortness of breath or blurred vision between now & the time of your surgery. 13. If you have ___dentures, they will be removed before going to the OR; we will provide you a container. If you wear ___contact lenses or ___glasses, they will be removed; please bring a case for them. 14. To provide excellent care visitors will be limited to 1 in the room at any given time. 15. Please bring picture ID and insurance card. 16. During flu season no children under the age of 15 are permitted in the hospital for the safety of all patients. 17. Other On day of surgery please come to main lobby and go to information desk. Please call AMBULATORY CARE if you have any further questions.    [facility]     [address]

## 2022-04-27 NOTE — PROGRESS NOTES
Dianna Benito Roman  2022  [facility]               History and Physical  Gastric Bypass and hiatal hernia repair    CHIEF COMPLAINT: Morbid obesity, GERD    HISTORY OF PRESENT ILLNESS: Erin Frederick is a morbidly-obese 46 y.o.  female, who weighs 255 lb (115.7 kg). She is 92 pounds over her ideal body weight. The Body mass index is 39.94 kg/m². She has lost 14 pounds over the past several months in preparation for surgery. She has multiple medical problems aggravated by her obesity. She wishes to have a gastric bypass so that she can lose more weight and keep the weight off. I have met with her on 2 different occasions in [facility], where we discussed the surgery in great detail and went over the risks and benefits. She has watched our informational video so she understands all of the extensive risks involved. She states that she understands all of these risks and wishes to proceed. Past Medical History  Patient Active Problem List   Diagnosis    Gastroesophageal reflux disease without esophagitis    Morbid obesity ([facility] [address]     Past Surgical History:   Procedure Laterality Date     SECTION      CHOLECYSTECTOMY      UPPER GASTROINTESTINAL ENDOSCOPY N/A 2021    EGD BIOPSY performed by Yelitza Rothman MD at [address] [place] [address]: [facility]   No family history on file. Medications:  Current Outpatient Medications   Medication Sig Dispense Refill    omeprazole (PRILOSEC) 20 MG delayed release capsule Take 1 capsule by mouth Daily 30 capsule 12    ondansetron (ZOFRAN-ODT) 4 MG disintegrating tablet Take 1 tablet by mouth every 8 hours as needed for Nausea or Vomiting 15 tablet 0    Fexofenadine-Pseudoephedrine (ALLEGRA-D PO) Take by mouth daily as needed       fluticasone (FLONASE) 50 MCG/ACT nasal spray 1 spray by Each Nostril route daily       No current facility-administered medications for this visit. Social History     Tobacco Use    Smoking status: Never Smoker    Smokeless tobacco: Never Used   Substance Use Topics    Alcohol use: No      Review of Systems    Review of Systems - General ROS: negative for - chills, fatigue or malaise  ENT ROS: negative for - hearing change, nasal congestion or nasal discharge  Allergy and Immunology ROS: negative for - hives, itchy/watery eyes or nasal congestion  Hematological and Lymphatic ROS: negative for - blood clots, blood transfusions, bruising or fatigue  Endocrine ROS: negative for - malaise/lethargy, mood swings, palpitations or polydipsia/polyuria  Breast ROS: negative for - new or changing breast lumps or nipple changes  Respiratory ROS: negative for - sputum changes, stridor, tachypnea or wheezing  Cardiovascular ROS: negative for - irregular heartbeat, loss of consciousness, murmur or orthopnea  Gastrointestinal ROS: negative for - constipation, diarrhea, gas/bloating, heartburn or hematemesis  Genito-Urinary ROS: negative for - erectile dysfunction, genital discharge, genital ulcers or hematuria  Musculoskeletal ROS: negative for - gait disturbance, muscle pain or muscular weakness      Physical Exam:   Vitals: BP (!) 166/100 (Site: Right Lower Arm, Position: Sitting, Cuff Size: Large Adult)   Pulse 88   Temp 97.4 °F (36.3 °C) (Temporal)   Resp 20   Ht 5' 7\" (1.702 m)   Wt 255 lb (115.7 kg)   BMI 39.94 kg/m²     General appearance: alert, appears stated age and cooperative, does ambulate easily. Head: Normocephalic, without obvious abnormality, atraumatic  Neck: no adenopathy, no carotid bruit, no JVD, supple, symmetrical, trachea midline and thyroid not enlarged,   Lungs: clear to auscultation bilaterally  Heart: regular rate and rhythm  Abdomen: soft, non-tender; bowel sounds normal; no masses,  no organomegaly  Extremities: extremities normal, atraumatic, no cyanosis or edema    Assessment:  Morbid obesity with failure of conservative therapy. Patient has been cleared psychologically and medically. The gall bladder is out. Upper endoscopy showed 3cm hiatal hernia. The patient was informed that risks include, but are not limited to: death, anastomotic leak, obstruction, bleeding, and sepsis. Any of these could require further surgery. Other risks include heart attack, DVT, PE, pneumonia, hernia, wound infection, the need for dilatations of the gastrojejunostomy, and the inability to lose appropriate weight and keep it off. We may not be able to do a Gastic Bypass, in that case we will do a Sleeve Gastrectomy. We discussed that our goal is to ameliorate the medical problems and not to obtain a specific body mass index. She understands the risks and benefits and wishes to proceed with the procedure. She has signed a consent form. Plan:  (27832) Laparoscopic Christ-en-Y Gastric Bypass possible hiatal hernia repair, possible robot assistance. She does not need Lovenox post-op.     Physician Signature: Electronically signed by Dr. Tracey Simms MD    Send copy of H&P to PCP, Leo Becerril DO

## 2022-04-27 NOTE — LETTER
[facility]  [address] [facility]  Phone: [phone]  Fax: [phone]    Saulo Veras RD, ERROL        May 5, 2022    [address] [place] [address]      Dear [facility]:        I personally wanted to thank you for selecting The [address] [place] [address] and [facility] as your center of choice for surgery. I wanted to take the time to let you know it means a lot to me to be able to work with you both before and after surgery and I am so glad that you will become part of our surgical family. It has been a privilege to get to know you at your 3 Hour Nutrition Class and become part of your new journey on life. I look forward to working with you in the future and helping you achieve your new goals. I can't wait to see the growth and transformation that occurs for you after your surgery. If at any time you have any questions you can always contact me [phone].      Respectfully,          Saulo Veras RDN/ ERROL  Bariatric Dietitian  [address] [place] [address] and [facility]  Certified In Adult Weight Management I and II  Certified In Pediatric and Adolescent Weight Management      Saulo Veras RD, ERROL

## 2022-04-27 NOTE — H&P (VIEW-ONLY)
[place] [facility]  2022  [facility]               History and Physical  Gastric Bypass and hiatal hernia repair    CHIEF COMPLAINT: Morbid obesity, GERD    HISTORY OF PRESENT ILLNESS: Denise Oconnell is a morbidly-obese 46 y.o.  female, who weighs 255 lb (115.7 kg). She is 92 pounds over her ideal body weight. The Body mass index is 39.94 kg/m². She has lost 14 pounds over the past several months in preparation for surgery. She has multiple medical problems aggravated by her obesity. She wishes to have a gastric bypass so that she can lose more weight and keep the weight off. I have met with her on 2 different occasions in [facility], where we discussed the surgery in great detail and went over the risks and benefits. She has watched our informational video so she understands all of the extensive risks involved. She states that she understands all of these risks and wishes to proceed. Past Medical History  Patient Active Problem List   Diagnosis    Gastroesophageal reflux disease without esophagitis    Morbid obesity ([facility] [address]     Past Surgical History:   Procedure Laterality Date     SECTION      CHOLECYSTECTOMY      UPPER GASTROINTESTINAL ENDOSCOPY N/A 2021    EGD BIOPSY performed by Laina Low MD at [address] [place] [address]: [facility]   No family history on file. Medications:  Current Outpatient Medications   Medication Sig Dispense Refill    omeprazole (PRILOSEC) 20 MG delayed release capsule Take 1 capsule by mouth Daily 30 capsule 12    ondansetron (ZOFRAN-ODT) 4 MG disintegrating tablet Take 1 tablet by mouth every 8 hours as needed for Nausea or Vomiting 15 tablet 0    Fexofenadine-Pseudoephedrine (ALLEGRA-D PO) Take by mouth daily as needed       fluticasone (FLONASE) 50 MCG/ACT nasal spray 1 spray by Each Nostril route daily       No current facility-administered medications for this visit. Social History     Tobacco Use    Smoking status: Never Smoker    Smokeless tobacco: Never Used   Substance Use Topics    Alcohol use: No      Review of Systems    Review of Systems - General ROS: negative for - chills, fatigue or malaise  ENT ROS: negative for - hearing change, nasal congestion or nasal discharge  Allergy and Immunology ROS: negative for - hives, itchy/watery eyes or nasal congestion  Hematological and Lymphatic ROS: negative for - blood clots, blood transfusions, bruising or fatigue  Endocrine ROS: negative for - malaise/lethargy, mood swings, palpitations or polydipsia/polyuria  Breast ROS: negative for - new or changing breast lumps or nipple changes  Respiratory ROS: negative for - sputum changes, stridor, tachypnea or wheezing  Cardiovascular ROS: negative for - irregular heartbeat, loss of consciousness, murmur or orthopnea  Gastrointestinal ROS: negative for - constipation, diarrhea, gas/bloating, heartburn or hematemesis  Genito-Urinary ROS: negative for - erectile dysfunction, genital discharge, genital ulcers or hematuria  Musculoskeletal ROS: negative for - gait disturbance, muscle pain or muscular weakness      Physical Exam:   Vitals: BP (!) 166/100 (Site: Right Lower Arm, Position: Sitting, Cuff Size: Large Adult)   Pulse 88   Temp 97.4 °F (36.3 °C) (Temporal)   Resp 20   Ht 5' 7\" (1.702 m)   Wt 255 lb (115.7 kg)   BMI 39.94 kg/m²     General appearance: alert, appears stated age and cooperative, does ambulate easily. Head: Normocephalic, without obvious abnormality, atraumatic  Neck: no adenopathy, no carotid bruit, no JVD, supple, symmetrical, trachea midline and thyroid not enlarged,   Lungs: clear to auscultation bilaterally  Heart: regular rate and rhythm  Abdomen: soft, non-tender; bowel sounds normal; no masses,  no organomegaly  Extremities: extremities normal, atraumatic, no cyanosis or edema    Assessment:  Morbid obesity with failure of conservative therapy. Patient has been cleared psychologically and medically. The gall bladder is out. Upper endoscopy showed 3cm hiatal hernia. The patient was informed that risks include, but are not limited to: death, anastomotic leak, obstruction, bleeding, and sepsis. Any of these could require further surgery. Other risks include heart attack, DVT, PE, pneumonia, hernia, wound infection, the need for dilatations of the gastrojejunostomy, and the inability to lose appropriate weight and keep it off. We may not be able to do a Gastic Bypass, in that case we will do a Sleeve Gastrectomy. We discussed that our goal is to ameliorate the medical problems and not to obtain a specific body mass index. She understands the risks and benefits and wishes to proceed with the procedure. She has signed a consent form. Plan:  (37149) Laparoscopic Christ-en-Y Gastric Bypass possible hiatal hernia repair, possible robot assistance. She does not need Lovenox post-op.     Physician Signature: Electronically signed by Dr. Lavern French MD    Send copy of H&P to PCP, Juanita Hicks DO

## 2022-05-02 ENCOUNTER — ANESTHESIA EVENT (OUTPATIENT)
Dept: OPERATING ROOM | Age: 52
DRG: 621 | End: 2022-05-02
Payer: COMMERCIAL

## 2022-05-03 ENCOUNTER — HOSPITAL ENCOUNTER (INPATIENT)
Age: 52
LOS: 1 days | Discharge: HOME | DRG: 621 | End: 2022-05-04
Attending: SURGERY | Admitting: SURGERY
Payer: COMMERCIAL

## 2022-05-03 ENCOUNTER — ANESTHESIA (OUTPATIENT)
Dept: OPERATING ROOM | Age: 52
DRG: 621 | End: 2022-05-03
Payer: COMMERCIAL

## 2022-05-03 VITALS
TEMPERATURE: 93.4 F | SYSTOLIC BLOOD PRESSURE: 133 MMHG | OXYGEN SATURATION: 90 % | RESPIRATION RATE: 20 BRPM | DIASTOLIC BLOOD PRESSURE: 97 MMHG

## 2022-05-03 PROBLEM — Z98.84 S/P GASTRIC BYPASS: Status: ACTIVE | Noted: 2022-05-03

## 2022-05-03 LAB — HCG(URINE) PREGNANCY TEST: NEGATIVE

## 2022-05-03 PROCEDURE — 7100000001 HC PACU RECOVERY - ADDTL 15 MIN: Performed by: SURGERY

## 2022-05-03 PROCEDURE — 2580000003 HC RX 258: Performed by: SURGERY

## 2022-05-03 PROCEDURE — 43644 LAP GASTRIC BYPASS/ROUX-EN-Y: CPT | Performed by: SURGERY

## 2022-05-03 PROCEDURE — 2720000010 HC SURG SUPPLY STERILE: Performed by: SURGERY

## 2022-05-03 PROCEDURE — 7100000000 HC PACU RECOVERY - FIRST 15 MIN: Performed by: SURGERY

## 2022-05-03 PROCEDURE — 15734 MUSCLE-SKIN GRAFT TRUNK: CPT | Performed by: SURGERY

## 2022-05-03 PROCEDURE — 3600000015 HC SURGERY LEVEL 5 ADDTL 15MIN: Performed by: SURGERY

## 2022-05-03 PROCEDURE — 6360000002 HC RX W HCPCS

## 2022-05-03 PROCEDURE — 0BQT4ZZ REPAIR DIAPHRAGM, PERCUTANEOUS ENDOSCOPIC APPROACH: ICD-10-PCS | Performed by: INTERNAL MEDICINE

## 2022-05-03 PROCEDURE — 2500000003 HC RX 250 WO HCPCS

## 2022-05-03 PROCEDURE — 2709999900 HC NON-CHARGEABLE SUPPLY: Performed by: SURGERY

## 2022-05-03 PROCEDURE — 49329 UNLSTD LAPS PX ABD PERTM&OMN: CPT | Performed by: SURGERY

## 2022-05-03 PROCEDURE — 3700000001 HC ADD 15 MINUTES (ANESTHESIA): Performed by: SURGERY

## 2022-05-03 PROCEDURE — 88305 TISSUE EXAM BY PATHOLOGIST: CPT

## 2022-05-03 PROCEDURE — 1200000000 HC SEMI PRIVATE

## 2022-05-03 PROCEDURE — 6360000002 HC RX W HCPCS: Performed by: ANESTHESIOLOGY

## 2022-05-03 PROCEDURE — 3600000005 HC SURGERY LEVEL 5 BASE: Performed by: SURGERY

## 2022-05-03 PROCEDURE — 43281 LAP PARAESOPHAG HERN REPAIR: CPT | Performed by: SURGERY

## 2022-05-03 PROCEDURE — 6360000002 HC RX W HCPCS: Performed by: SURGERY

## 2022-05-03 PROCEDURE — 3700000000 HC ANESTHESIA ATTENDED CARE: Performed by: SURGERY

## 2022-05-03 PROCEDURE — 81025 URINE PREGNANCY TEST: CPT

## 2022-05-03 PROCEDURE — 6370000000 HC RX 637 (ALT 250 FOR IP): Performed by: SURGERY

## 2022-05-03 PROCEDURE — 0D164ZA BYPASS STOMACH TO JEJUNUM, PERCUTANEOUS ENDOSCOPIC APPROACH: ICD-10-PCS | Performed by: INTERNAL MEDICINE

## 2022-05-03 PROCEDURE — 99024 POSTOP FOLLOW-UP VISIT: CPT | Performed by: SURGERY

## 2022-05-03 RX ORDER — SODIUM CHLORIDE 9 MG/ML
INJECTION, SOLUTION INTRAVENOUS PRN
Status: DISCONTINUED | OUTPATIENT
Start: 2022-05-03 | End: 2022-05-03 | Stop reason: HOSPADM

## 2022-05-03 RX ORDER — SODIUM CHLORIDE 0.9 % (FLUSH) 0.9 %
5-40 SYRINGE (ML) INJECTION PRN
Status: DISCONTINUED | OUTPATIENT
Start: 2022-05-03 | End: 2022-05-04 | Stop reason: HOSPADM

## 2022-05-03 RX ORDER — MEPERIDINE HYDROCHLORIDE 25 MG/ML
12.5 INJECTION INTRAMUSCULAR; INTRAVENOUS; SUBCUTANEOUS EVERY 5 MIN PRN
Status: DISCONTINUED | OUTPATIENT
Start: 2022-05-03 | End: 2022-05-03 | Stop reason: HOSPADM

## 2022-05-03 RX ORDER — CEFAZOLIN SODIUM 2 G/50ML
2000 SOLUTION INTRAVENOUS
Status: COMPLETED | OUTPATIENT
Start: 2022-05-03 | End: 2022-05-03

## 2022-05-03 RX ORDER — KETOROLAC TROMETHAMINE 30 MG/ML
INJECTION, SOLUTION INTRAMUSCULAR; INTRAVENOUS
Status: DISCONTINUED
Start: 2022-05-03 | End: 2022-05-03 | Stop reason: WASHOUT

## 2022-05-03 RX ORDER — MORPHINE SULFATE 2 MG/ML
2 INJECTION, SOLUTION INTRAMUSCULAR; INTRAVENOUS
Status: DISCONTINUED | OUTPATIENT
Start: 2022-05-03 | End: 2022-05-04 | Stop reason: HOSPADM

## 2022-05-03 RX ORDER — SODIUM CHLORIDE, SODIUM LACTATE, POTASSIUM CHLORIDE, CALCIUM CHLORIDE 600; 310; 30; 20 MG/100ML; MG/100ML; MG/100ML; MG/100ML
INJECTION, SOLUTION INTRAVENOUS CONTINUOUS
Status: DISCONTINUED | OUTPATIENT
Start: 2022-05-03 | End: 2022-05-04 | Stop reason: HOSPADM

## 2022-05-03 RX ORDER — FLUTICASONE PROPIONATE 50 MCG
1 SPRAY, SUSPENSION (ML) NASAL DAILY
Status: DISCONTINUED | OUTPATIENT
Start: 2022-05-03 | End: 2022-05-04 | Stop reason: HOSPADM

## 2022-05-03 RX ORDER — ONDANSETRON 2 MG/ML
INJECTION INTRAMUSCULAR; INTRAVENOUS PRN
Status: DISCONTINUED | OUTPATIENT
Start: 2022-05-03 | End: 2022-05-03 | Stop reason: SDUPTHER

## 2022-05-03 RX ORDER — MIDAZOLAM HYDROCHLORIDE 1 MG/ML
INJECTION INTRAMUSCULAR; INTRAVENOUS PRN
Status: DISCONTINUED | OUTPATIENT
Start: 2022-05-03 | End: 2022-05-03 | Stop reason: SDUPTHER

## 2022-05-03 RX ORDER — ROCURONIUM BROMIDE 10 MG/ML
INJECTION, SOLUTION INTRAVENOUS PRN
Status: DISCONTINUED | OUTPATIENT
Start: 2022-05-03 | End: 2022-05-03 | Stop reason: SDUPTHER

## 2022-05-03 RX ORDER — SODIUM CHLORIDE 0.9 % (FLUSH) 0.9 %
5-40 SYRINGE (ML) INJECTION EVERY 12 HOURS SCHEDULED
Status: DISCONTINUED | OUTPATIENT
Start: 2022-05-03 | End: 2022-05-03 | Stop reason: HOSPADM

## 2022-05-03 RX ORDER — ONDANSETRON 2 MG/ML
4 INJECTION INTRAMUSCULAR; INTRAVENOUS EVERY 6 HOURS PRN
Status: DISCONTINUED | OUTPATIENT
Start: 2022-05-03 | End: 2022-05-04 | Stop reason: HOSPADM

## 2022-05-03 RX ORDER — SCOLOPAMINE TRANSDERMAL SYSTEM 1 MG/1
1 PATCH, EXTENDED RELEASE TRANSDERMAL
Status: DISCONTINUED | OUTPATIENT
Start: 2022-05-03 | End: 2022-05-03

## 2022-05-03 RX ORDER — PROPOFOL 10 MG/ML
INJECTION, EMULSION INTRAVENOUS PRN
Status: DISCONTINUED | OUTPATIENT
Start: 2022-05-03 | End: 2022-05-03 | Stop reason: SDUPTHER

## 2022-05-03 RX ORDER — NEOSTIGMINE METHYLSULFATE 1 MG/ML
INJECTION, SOLUTION INTRAVENOUS PRN
Status: DISCONTINUED | OUTPATIENT
Start: 2022-05-03 | End: 2022-05-03 | Stop reason: SDUPTHER

## 2022-05-03 RX ORDER — SODIUM CHLORIDE 0.9 % (FLUSH) 0.9 %
5-40 SYRINGE (ML) INJECTION PRN
Status: DISCONTINUED | OUTPATIENT
Start: 2022-05-03 | End: 2022-05-03 | Stop reason: HOSPADM

## 2022-05-03 RX ORDER — SODIUM CHLORIDE, SODIUM LACTATE, POTASSIUM CHLORIDE, CALCIUM CHLORIDE 600; 310; 30; 20 MG/100ML; MG/100ML; MG/100ML; MG/100ML
INJECTION, SOLUTION INTRAVENOUS CONTINUOUS
Status: DISCONTINUED | OUTPATIENT
Start: 2022-05-03 | End: 2022-05-03 | Stop reason: HOSPADM

## 2022-05-03 RX ORDER — SODIUM CHLORIDE 0.9 % (FLUSH) 0.9 %
5-40 SYRINGE (ML) INJECTION EVERY 12 HOURS SCHEDULED
Status: DISCONTINUED | OUTPATIENT
Start: 2022-05-03 | End: 2022-05-04 | Stop reason: HOSPADM

## 2022-05-03 RX ORDER — ONDANSETRON 2 MG/ML
4 INJECTION INTRAMUSCULAR; INTRAVENOUS ONCE
Status: COMPLETED | OUTPATIENT
Start: 2022-05-03 | End: 2022-05-03

## 2022-05-03 RX ORDER — KETOROLAC TROMETHAMINE 30 MG/ML
30 INJECTION, SOLUTION INTRAMUSCULAR; INTRAVENOUS ONCE
Status: COMPLETED | OUTPATIENT
Start: 2022-05-03 | End: 2022-05-03

## 2022-05-03 RX ORDER — GLYCOPYRROLATE 1 MG/5 ML
SYRINGE (ML) INTRAVENOUS PRN
Status: DISCONTINUED | OUTPATIENT
Start: 2022-05-03 | End: 2022-05-03 | Stop reason: SDUPTHER

## 2022-05-03 RX ORDER — FENTANYL CITRATE 50 UG/ML
INJECTION, SOLUTION INTRAMUSCULAR; INTRAVENOUS PRN
Status: DISCONTINUED | OUTPATIENT
Start: 2022-05-03 | End: 2022-05-03 | Stop reason: SDUPTHER

## 2022-05-03 RX ORDER — MEPERIDINE HYDROCHLORIDE 25 MG/ML
INJECTION INTRAMUSCULAR; INTRAVENOUS; SUBCUTANEOUS
Status: COMPLETED
Start: 2022-05-03 | End: 2022-05-03

## 2022-05-03 RX ORDER — DEXAMETHASONE SODIUM PHOSPHATE 10 MG/ML
INJECTION, SOLUTION INTRAMUSCULAR; INTRAVENOUS PRN
Status: DISCONTINUED | OUTPATIENT
Start: 2022-05-03 | End: 2022-05-03 | Stop reason: SDUPTHER

## 2022-05-03 RX ORDER — SODIUM CHLORIDE 9 MG/ML
INJECTION, SOLUTION INTRAVENOUS PRN
Status: DISCONTINUED | OUTPATIENT
Start: 2022-05-03 | End: 2022-05-04 | Stop reason: HOSPADM

## 2022-05-03 RX ORDER — SODIUM CHLORIDE 9 MG/ML
25 INJECTION, SOLUTION INTRAVENOUS PRN
Status: DISCONTINUED | OUTPATIENT
Start: 2022-05-03 | End: 2022-05-03 | Stop reason: HOSPADM

## 2022-05-03 RX ORDER — MORPHINE SULFATE 4 MG/ML
4 INJECTION, SOLUTION INTRAMUSCULAR; INTRAVENOUS
Status: DISCONTINUED | OUTPATIENT
Start: 2022-05-03 | End: 2022-05-04 | Stop reason: HOSPADM

## 2022-05-03 RX ORDER — LIDOCAINE HYDROCHLORIDE 20 MG/ML
INJECTION, SOLUTION INTRAVENOUS PRN
Status: DISCONTINUED | OUTPATIENT
Start: 2022-05-03 | End: 2022-05-03 | Stop reason: SDUPTHER

## 2022-05-03 RX ADMIN — PHENYLEPHRINE HYDROCHLORIDE 100 MCG: 10 INJECTION INTRAVENOUS at 07:24

## 2022-05-03 RX ADMIN — SODIUM CHLORIDE, POTASSIUM CHLORIDE, SODIUM LACTATE AND CALCIUM CHLORIDE: 600; 310; 30; 20 INJECTION, SOLUTION INTRAVENOUS at 16:48

## 2022-05-03 RX ADMIN — ONDANSETRON 4 MG: 2 INJECTION INTRAMUSCULAR; INTRAVENOUS at 23:10

## 2022-05-03 RX ADMIN — Medication 0.5 MG: at 08:34

## 2022-05-03 RX ADMIN — HYDROMORPHONE HYDROCHLORIDE 0.5 MG: 1 INJECTION, SOLUTION INTRAMUSCULAR; INTRAVENOUS; SUBCUTANEOUS at 09:12

## 2022-05-03 RX ADMIN — LIDOCAINE HYDROCHLORIDE 100 MG: 20 INJECTION, SOLUTION INTRAVENOUS at 07:03

## 2022-05-03 RX ADMIN — DEXAMETHASONE SODIUM PHOSPHATE 10 MG: 10 INJECTION, SOLUTION INTRAMUSCULAR; INTRAVENOUS at 07:07

## 2022-05-03 RX ADMIN — MIDAZOLAM 2 MG: 1 INJECTION INTRAMUSCULAR; INTRAVENOUS at 06:58

## 2022-05-03 RX ADMIN — Medication 3 MG: at 08:00

## 2022-05-03 RX ADMIN — Medication 0.6 MG: at 08:00

## 2022-05-03 RX ADMIN — FENTANYL CITRATE 100 MCG: 50 INJECTION, SOLUTION INTRAMUSCULAR; INTRAVENOUS at 07:03

## 2022-05-03 RX ADMIN — MEPERIDINE HYDROCHLORIDE 12.5 MG: 25 INJECTION INTRAMUSCULAR; INTRAVENOUS; SUBCUTANEOUS at 08:54

## 2022-05-03 RX ADMIN — MORPHINE SULFATE 2 MG: 2 INJECTION, SOLUTION INTRAMUSCULAR; INTRAVENOUS at 16:43

## 2022-05-03 RX ADMIN — ONDANSETRON 4 MG: 2 INJECTION INTRAMUSCULAR; INTRAVENOUS at 06:09

## 2022-05-03 RX ADMIN — MEPERIDINE HYDROCHLORIDE 12.5 MG: 25 INJECTION, SOLUTION INTRAMUSCULAR; INTRAVENOUS; SUBCUTANEOUS at 08:54

## 2022-05-03 RX ADMIN — PHENYLEPHRINE HYDROCHLORIDE 100 MCG: 10 INJECTION INTRAVENOUS at 07:17

## 2022-05-03 RX ADMIN — KETOROLAC TROMETHAMINE 30 MG: 30 INJECTION, SOLUTION INTRAMUSCULAR at 11:08

## 2022-05-03 RX ADMIN — FENTANYL CITRATE 100 MCG: 50 INJECTION, SOLUTION INTRAMUSCULAR; INTRAVENOUS at 08:06

## 2022-05-03 RX ADMIN — ROCURONIUM BROMIDE 35 MG: 10 INJECTION INTRAVENOUS at 07:03

## 2022-05-03 RX ADMIN — MORPHINE SULFATE 2 MG: 2 INJECTION, SOLUTION INTRAMUSCULAR; INTRAVENOUS at 23:11

## 2022-05-03 RX ADMIN — SODIUM CHLORIDE, POTASSIUM CHLORIDE, SODIUM LACTATE AND CALCIUM CHLORIDE: 600; 310; 30; 20 INJECTION, SOLUTION INTRAVENOUS at 11:11

## 2022-05-03 RX ADMIN — SODIUM CHLORIDE, POTASSIUM CHLORIDE, SODIUM LACTATE AND CALCIUM CHLORIDE: 600; 310; 30; 20 INJECTION, SOLUTION INTRAVENOUS at 06:08

## 2022-05-03 RX ADMIN — HYDROMORPHONE HYDROCHLORIDE 0.5 MG: 1 INJECTION, SOLUTION INTRAMUSCULAR; INTRAVENOUS; SUBCUTANEOUS at 08:34

## 2022-05-03 RX ADMIN — PROPOFOL 200 MG: 10 INJECTION, EMULSION INTRAVENOUS at 07:03

## 2022-05-03 RX ADMIN — PHENYLEPHRINE HYDROCHLORIDE 100 MCG: 10 INJECTION INTRAVENOUS at 07:29

## 2022-05-03 RX ADMIN — ONDANSETRON 4 MG: 2 INJECTION INTRAMUSCULAR; INTRAVENOUS at 16:47

## 2022-05-03 RX ADMIN — ONDANSETRON 4 MG: 2 INJECTION INTRAMUSCULAR; INTRAVENOUS at 07:51

## 2022-05-03 RX ADMIN — Medication 0.5 MG: at 09:12

## 2022-05-03 RX ADMIN — CEFAZOLIN SODIUM 2000 MG: 2 SOLUTION INTRAVENOUS at 07:07

## 2022-05-03 ASSESSMENT — PAIN - FUNCTIONAL ASSESSMENT
PAIN_FUNCTIONAL_ASSESSMENT: ACTIVITIES ARE NOT PREVENTED
PAIN_FUNCTIONAL_ASSESSMENT: NONE - DENIES PAIN
PAIN_FUNCTIONAL_ASSESSMENT: ACTIVITIES ARE NOT PREVENTED

## 2022-05-03 ASSESSMENT — PULMONARY FUNCTION TESTS
PIF_VALUE: 35
PIF_VALUE: 35
PIF_VALUE: 30
PIF_VALUE: 34
PIF_VALUE: 33
PIF_VALUE: 32
PIF_VALUE: 35
PIF_VALUE: 4
PIF_VALUE: 3
PIF_VALUE: 35
PIF_VALUE: 35
PIF_VALUE: 34
PIF_VALUE: 35
PIF_VALUE: 36
PIF_VALUE: 35
PIF_VALUE: 30
PIF_VALUE: 27
PIF_VALUE: 26
PIF_VALUE: 35
PIF_VALUE: 35
PIF_VALUE: 34
PIF_VALUE: 20
PIF_VALUE: 35
PIF_VALUE: 20
PIF_VALUE: 35
PIF_VALUE: 0
PIF_VALUE: 35
PIF_VALUE: 36
PIF_VALUE: 35
PIF_VALUE: 2
PIF_VALUE: 36
PIF_VALUE: 36
PIF_VALUE: 4
PIF_VALUE: 2
PIF_VALUE: 11
PIF_VALUE: 27
PIF_VALUE: 3
PIF_VALUE: 35
PIF_VALUE: 28
PIF_VALUE: 2
PIF_VALUE: 35
PIF_VALUE: 34
PIF_VALUE: 24
PIF_VALUE: 34
PIF_VALUE: 35
PIF_VALUE: 23
PIF_VALUE: 25
PIF_VALUE: 27
PIF_VALUE: 0
PIF_VALUE: 35
PIF_VALUE: 36
PIF_VALUE: 27
PIF_VALUE: 35
PIF_VALUE: 34
PIF_VALUE: 31
PIF_VALUE: 35
PIF_VALUE: 34
PIF_VALUE: 28
PIF_VALUE: 35
PIF_VALUE: 35
PIF_VALUE: 1
PIF_VALUE: 35

## 2022-05-03 ASSESSMENT — PAIN DESCRIPTION - FREQUENCY
FREQUENCY: INTERMITTENT
FREQUENCY: CONTINUOUS

## 2022-05-03 ASSESSMENT — PAIN DESCRIPTION - ONSET
ONSET: AWAKENED FROM SLEEP
ONSET: ON-GOING

## 2022-05-03 ASSESSMENT — PAIN DESCRIPTION - DESCRIPTORS
DESCRIPTORS: ACHING;DISCOMFORT
DESCRIPTORS: SORE
DESCRIPTORS: ACHING;DISCOMFORT;CRAMPING
DESCRIPTORS: SORE
DESCRIPTORS: SORE;TENDER

## 2022-05-03 ASSESSMENT — PAIN DESCRIPTION - PAIN TYPE
TYPE: SURGICAL PAIN

## 2022-05-03 ASSESSMENT — PAIN DESCRIPTION - LOCATION
LOCATION: ABDOMEN

## 2022-05-03 ASSESSMENT — PAIN DESCRIPTION - ORIENTATION
ORIENTATION: MID
ORIENTATION: MID;UPPER
ORIENTATION: MID

## 2022-05-03 ASSESSMENT — PAIN SCALES - GENERAL
PAINLEVEL_OUTOF10: 6
PAINLEVEL_OUTOF10: 7
PAINLEVEL_OUTOF10: 6
PAINLEVEL_OUTOF10: 6
PAINLEVEL_OUTOF10: 5
PAINLEVEL_OUTOF10: 8
PAINLEVEL_OUTOF10: 0
PAINLEVEL_OUTOF10: 4

## 2022-05-03 NOTE — INTERVAL H&P NOTE
Update History & Physical    The patient's History and Physical of April 27, 2022 was reviewed with the patient and I examined the patient. There was no change. The surgical site was confirmed by the patient and me. Plan: The risks, benefits, expected outcome, and alternative to the recommended procedure have been discussed with the patient. Patient understands and wants to proceed with the procedure.      Electronically signed by Ted Jernigan MD on 5/3/2022 at 6:32 AM

## 2022-05-03 NOTE — ANESTHESIA POSTPROCEDURE EVALUATION
Department of Anesthesiology  Postprocedure Note    Patient: Shea Reina  MRN: [id number]  [place]: 1970  Date of evaluation: 5/3/2022  Time:  8:50 AM     Procedure Summary     Date: 05/03/22 Room / Location: [address] [place] [address] 03 / [address] [facility]    Anesthesia Start: 5349 Anesthesia Stop: 7041    Procedure: GASTRIC BYPASS MARCO ANTONIO-EN-Y LAPAROSCOPIC (N/A Abdomen) Diagnosis: (MORBID OBESITY)    Surgeons: Seth De La Garza MD Responsible Provider: Stephanie Meza MD    Anesthesia Type: general ASA Status: 3          Anesthesia Type: general    Noemí Phase I: Noemí Score: 7    Noemí Phase II:      Last vitals: Reviewed and per EMR flowsheets.        Anesthesia Post Evaluation    Patient location during evaluation: PACU  Patient participation: complete - patient participated  Level of consciousness: awake and alert  Pain score: 4  Airway patency: patent  Nausea & Vomiting: no nausea and no vomiting  Cardiovascular status: hemodynamically stable  Respiratory status: acceptable  Hydration status: euvolemic  Multimodal analgesia pain management approach

## 2022-05-03 NOTE — CONSULTS
Department of Internal Medicine      HISTORY OF PRESENT ILLNESS:      The patient is a 46 y.o. female who presents with having elective laparoscopic Christ-en-Y gastric bypass with large symptomatic hiatal hernia repair, mobilization and placement of myocutaneous flap and omental flap. Patient is seen postop. Patient has expected postop discomfort. She denies any problem chest pain, dizziness or unusual shortness of breath. Temperature is 98.4 with heart rate of 68 blood pressure 143/86. O2 sat  95% on room air at rest.    Past Medical History:    Past Medical History:   Diagnosis Date    PONV (postoperative nausea and vomiting)      Past Surgical History:    Past Surgical History:   Procedure Laterality Date     SECTION      CHOLECYSTECTOMY      CYST REMOVAL      right great toe    UPPER GASTROINTESTINAL ENDOSCOPY N/A 2021    EGD BIOPSY performed by Kev Montoya MD at [place] [address]       Medications Prior to Admission:    @  Prior to Admission medications    Medication Sig Start Date End Date Taking?  Authorizing Provider   omeprazole (PRILOSEC) 20 MG delayed release capsule Take 1 capsule by mouth Daily 22  Kev Montoya MD   ondansetron (ZOFRAN-ODT) 4 MG disintegrating tablet Take 1 tablet by mouth every 8 hours as needed for Nausea or Vomiting 22   Kev Montoya MD   Fexofenadine-Pseudoephedrine (ALLEGRA-D PO) Take by mouth daily as needed     Historical Provider, MD   fluticasone (FLONASE) 50 MCG/ACT nasal spray 1 spray by Each Nostril route daily    Historical Provider, MD       Allergies:  Codeine    Social History:   Social History     Socioeconomic History    Marital status:      Spouse name: Not on file    Number of children: Not on file    Years of education: Not on file    Highest education level: Not on file   Occupational History    Not on file   Tobacco Use    Smoking status: Never Smoker    Smokeless tobacco: Never Used   Substance and Sexual Activity    Alcohol use: No    Drug use: No    Sexual activity: Not on file   Other Topics Concern    Not on file   Social History Narrative    Not on file     Social Determinants of Health     Financial Resource Strain:     Difficulty of Paying Living Expenses: Not on file   Food Insecurity:     Worried About Running Out of Food in the Last Year: Not on file    Anila of Food in the Last Year: Not on file   Transportation Needs:     Lack of Transportation (Medical): Not on file    Lack of Transportation (Non-Medical): Not on file   Physical Activity:     Days of Exercise per Week: Not on file    Minutes of Exercise per Session: Not on file   Stress:     Feeling of Stress : Not on file   Social Connections:     Frequency of Communication with Friends and Family: Not on file    Frequency of Social Gatherings with Friends and Family: Not on file    Attends Spiritism Services: Not on file    Active Member of [address] [place] [address] or Organizations: Not on file    Attends Club or Organization Meetings: Not on file    Marital Status: Not on file   Intimate Partner Violence:     Fear of Current or Ex-Partner: Not on file    Emotionally Abused: Not on file    Physically Abused: Not on file    Sexually Abused: Not on file   Housing Stability:     Unable to Pay for Housing in the Last Year: Not on file    Number of [place] in the Last Year: Not on file    Unstable Housing in the Last Year: Not on file       Family History:   No family history on file. REVIEW OF SYSTEMS:    Gen: Patient denies any lightheadedness or dizziness. No LOC or syncope. No fevers or chills. HEENT: No earache, sore throat or nasal congestion. Resp: Denies cough, hemoptysis or sputum production. Cardiac: Denies chest pain, SOB, diaphoresis or palpitations. GI: + Heartburn. No nausea, vomiting, diarrhea or constipation. No melena or hematochezia. : No urinary complaints, dysuria, hematuria or frequency. MSK: No extremity weakness, paralysis or paresthesias. PHYSICAL EXAM:    Vitals:  BP (!) 143/86   Pulse 68   Temp 98.4 °F (36.9 °C) (Oral)   Resp 16   Ht 5' 7\" (1.702 m)   Wt 256 lb (116.1 kg)   SpO2 98%   BMI 40.10 kg/m²     General:  This is a 46 y.o. yo female who is alert and oriented in expected postop distress  HEENT:  Head is normocephalic and atraumatic, PERRLA, EOMI, mucus membranes moist with no pharyngeal erythema or exudate. Neck:  Supple with no carotid bruits, JVD or thyromegaly.   No cervical adenopathy  CV:  Regular rate and rhythm, no murmurs  Lungs:  Clear to auscultation bilaterally with no wheezes, rales or rhonchi  Abdomen:  + Postop abdomen  and mildly distended, bowel sounds present  Extremities:  No edema, peripheral pulses intact bilaterally  Neuro:  Cranial nerves II-XII grossly intact; motor and sensory function intact with no focal deficits  Skin:  No rashes, lesions or wounds      DATA:  CBC with Differential:    Lab Results   Component Value Date    WBC 4.3 04/27/2022    RBC 4.41 04/27/2022    HGB 12.7 04/27/2022    HCT 39.6 04/27/2022     04/27/2022    MCV 89.8 04/27/2022    MCH 28.8 04/27/2022    MCHC 32.1 04/27/2022    RDW 13.0 04/27/2022    LYMPHOPCT 18.7 03/25/2017    MONOPCT 11.2 03/25/2017    BASOPCT 0.1 03/25/2017    MONOSABS 0.82 03/25/2017    LYMPHSABS 1.37 03/25/2017    EOSABS 0.06 03/25/2017    BASOSABS 0.01 03/25/2017     CMP:    Lab Results   Component Value Date     04/27/2022    K 3.9 04/27/2022     04/27/2022    CO2 25 04/27/2022    BUN 9 04/27/2022    CREATININE 0.9 04/27/2022    GFRAA >60 04/27/2022    LABGLOM >60 04/27/2022    GLUCOSE 83 04/27/2022    PROT 7.8 04/27/2022    LABALBU 4.3 04/27/2022    CALCIUM 9.6 04/27/2022    BILITOT 0.5 04/27/2022    ALKPHOS 110 04/27/2022    AST 22 04/27/2022    ALT 15 04/27/2022     Magnesium:  No results found for: MG  Phosphorus:  No results found for: PHOS  PT/INR:  No results found for: PROTIME, INR  Troponin:  No results found for: TROPONINI  U/A:  No results found for: NITRITE, COLORU, PROTEINU, PHUR, LABCAST, WBCUA, RBCUA, MUCUS, TRICHOMONAS, YEAST, BACTERIA, CLARITYU, SPECGRAV, LEUKOCYTESUR, UROBILINOGEN, BILIRUBINUR, BLOODU, GLUCOSEU, AMORPHOUS  ABG:  No results found for: PH, PCO2, PO2, HCO3, BE, THGB, TCO2, O2SAT  HgBA1c:  No results found for: LABA1C  FLP:    Lab Results   Component Value Date    TRIG 95 11/23/2021     TSH:  No results found for: TSH  IRON:  No results found for: IRON  LIPASE:  No results found for: LIPASE    ASSESSMENT AND PLAN:      Patient Active Problem List    Diagnosis Date Noted    S/P gastric bypass 5/3/2022 05/03/2022    Morbid obesity ([address] 12/15/2021    Gastroesophageal reflux disease without esophagitis      Plan:   Admitted to medical surgical floor. Home medications reviewed  Monitor heart rate, blood pressure, O2 saturation    BMP, CBC in a.m.     Cherie Salinas DO, RENETTA  5/3/2022  10:59 AM

## 2022-05-03 NOTE — H&P
History and Physical  Gastric Bypass and hiatal hernia repair     CHIEF COMPLAINT: Morbid obesity, GERD     HISTORY OF PRESENT ILLNESS: Olivier Kramer is a morbidly-obese 46 y.o.  female, who weighs 255 lb (115.7 kg). She is 92 pounds over her ideal body weight. The Body mass index is 39.94 kg/m². She has lost 14 pounds over the past several months in preparation for surgery. She has multiple medical problems aggravated by her obesity. She wishes to have a gastric bypass so that she can lose more weight and keep the weight off. I have met with her on 2 different occasions [facility], where we discussed the surgery in great detail and went over the risks and benefits. She has watched our informational video so she understands all of the extensive risks involved.  She states that she understands all of these risks and wishes to proceed.     Past Medical History      Patient Active Problem List   Diagnosis    Gastroesophageal reflux disease without esophagitis    Morbid obesity ([address]      Past Surgical History         Past Surgical History:   Procedure Laterality Date     SECTION       CHOLECYSTECTOMY        UPPER GASTROINTESTINAL ENDOSCOPY N/A 2021     EGD BIOPSY performed by Kev Montoya MD at [facility] ENDOSCOPY         Allergies: Codeine   Family History   No family history on file.        Medications:  Current Facility-Administered Medications          Current Outpatient Medications   Medication Sig Dispense Refill    omeprazole (PRILOSEC) 20 MG delayed release capsule Take 1 capsule by mouth Daily 30 capsule 12    ondansetron (ZOFRAN-ODT) 4 MG disintegrating tablet Take 1 tablet by mouth every 8 hours as needed for Nausea or Vomiting 15 tablet 0    Fexofenadine-Pseudoephedrine (ALLEGRA-D PO) Take by mouth daily as needed         fluticasone (FLONASE) 50 MCG/ACT nasal spray 1 spray by Each Nostril route daily          No current facility-administered medications for this visit. Social History           Tobacco Use    Smoking status: Never Smoker    Smokeless tobacco: Never Used   Substance Use Topics    Alcohol use: No      Review of Systems     Review of Systems - General ROS: negative for - chills, fatigue or malaise  ENT ROS: negative for - hearing change, nasal congestion or nasal discharge  Allergy and Immunology ROS: negative for - hives, itchy/watery eyes or nasal congestion  Hematological and Lymphatic ROS: negative for - blood clots, blood transfusions, bruising or fatigue  Endocrine ROS: negative for - malaise/lethargy, mood swings, palpitations or polydipsia/polyuria  Breast ROS: negative for - new or changing breast lumps or nipple changes  Respiratory ROS: negative for - sputum changes, stridor, tachypnea or wheezing  Cardiovascular ROS: negative for - irregular heartbeat, loss of consciousness, murmur or orthopnea  Gastrointestinal ROS: negative for - constipation, diarrhea, gas/bloating, heartburn or hematemesis  Genito-Urinary ROS: negative for - erectile dysfunction, genital discharge, genital ulcers or hematuria  Musculoskeletal ROS: negative for - gait disturbance, muscle pain or muscular weakness        Physical Exam:   Vitals: BP (!) 166/100 (Site: Right Lower Arm, Position: Sitting, Cuff Size: Large Adult)   Pulse 88   Temp 97.4 °F (36.3 °C) (Temporal)   Resp 20   Ht 5' 7\" (1.702 m)   Wt 255 lb (115.7 kg)   BMI 39.94 kg/m²      General appearance: alert, appears stated age and cooperative, does ambulate easily.   Head: Normocephalic, without obvious abnormality, atraumatic  Neck: no adenopathy, no carotid bruit, no JVD, supple, symmetrical, trachea midline and thyroid not enlarged,   Lungs: clear to auscultation bilaterally  Heart: regular rate and rhythm  Abdomen: soft, non-tender; bowel sounds normal; no masses,  no organomegaly  Extremities: extremities normal, atraumatic, no cyanosis or edema     Assessment:  Morbid obesity with failure of conservative therapy. Patient has been cleared psychologically and medically. The gall bladder is out. Upper endoscopy showed 3cm hiatal hernia. The patient was informed that risks include, but are not limited to: death, anastomotic leak, obstruction, bleeding, and sepsis. Any of these could require further surgery. Other risks include heart attack, DVT, PE, pneumonia, hernia, wound infection, the need for dilatations of the gastrojejunostomy, and the inability to lose appropriate weight and keep it off. We may not be able to do a Gastic Bypass, in that case we will do a Sleeve Gastrectomy. We discussed that our goal is to ameliorate the medical problems and not to obtain a specific body mass index. She understands the risks and benefits and wishes to proceed with the procedure. She has signed a consent form.         Plan:  ([id number]) Laparoscopic Christ-en-Y Gastric Bypass possible hiatal hernia repair, possible robot assistance.  She does not need Lovenox post-op.     Physician Signature: Electronically signed by Dr. Jerod Ennis MD

## 2022-05-03 NOTE — OP NOTE
[address] [place] [address] Drive    Operative Report  DATE OF PROCEDURE: 5/3/2022  SURGEON: Dr. Brissa Reyes MD, M.D.   Hemal Dee: Olga Stout  PREOPERATIVE DIAGNOSES:   Patient Active Problem List   Diagnosis    Gastroesophageal reflux disease without esophagitis    Morbid obesity ([address]    S/P gastric bypass       Morbid obesity , large symptomatic hiatal hernia , GERD   POSTOPERATIVE DIAGNOSES:   Same    OPERATION:   1) Laparoscopic Christ-en-Y gastric bypass. 2) large symptomatic Hiatal hernia repair  3) mobilization and placement of myocutaneous flap  4) mobilization and placement of omental flap           ANESTHESIA: General endotracheal.   ESTIMATED BLOOD LOSS: 10 mL. COMPLICATIONS: None. HISTORY: Ирина Robles is a morbidly-obese 46 y.o.  female, who weighs 256 pounds. The Body mass index is 40.1 kg/m². She has multiple medical problems aggravated by her obesity and a hiatal hernia causing reflux. She wishes to have a gastric bypass so that she can lose more weight and keep the weight off and have the hiatal hernia repaired to help decrease the reflux problems. She understands the extensive risks involved in the surgery and wishes to proceed. PROCEDURE: The patient was placed on the table in the supine position and placed under general endotracheal anesthesia. She had SCDs on the legs as DVT prophylaxis. She had Ancef 2 g IV. Orogastric tube placed in the stomach, then removed, Jane placed and left in the bladder. The abdomen was shaved, then prepped with DuraPrep and draped in the usual sterile fashion. Then, 0.25% Marcaine plus epinephrine was injected into the skin and muscle of each incision to help with postoperative pain control. A 5-mm incision was made above and left of the umbilicus. Varies needle used to insufflate with CO2. A 5 mm trocar and 5 mm 45 degree angled scope was placed.  A 12 mm trocar was placed in the right mid abdomen, a 5-mm trocar in the left midabdomen, 5-mm trocar in the right lateral subcostal region. The head of the bed was elevated. The liver was small. Jacky Oz liver retractor was placed below the xiphoid. Liver was retracted. The  hiatal hernia defect could be seen. The dissection was carried out with the hook cautery,  the peritoneum overlying the right bj was incised, and the plane along the hernia sac developed. 1. Complete 360-degree dissection and exposure of the phrenoesophageal ligament the right and left crural columns. 2. Dissection of the hernia sac out of the mediastinum and complete reduction. 3. Dissection of the hernia sac off the distal esophagus. 4. Dissection of the hernia sac off the right and left parietal pleura. 5.  Large 4-5c,size of the hernia sac. 6. Complete posterior repair of the hernia defect created by the right and left posterior crural columns. \" The dissection extended anteriorly and along the left bj. The base of the crural confluence was dissected free of adhesions to the sac. The hernia sac was carefully dissected into the mediastinum with caudal traction. The interfaces between the pleura, pericardium, spine, and aorta were developed as the dissection was carried cephalad to the top of the hernia sac. The sac contents were completely reduced back into the abdominal cavity. A 1/4 inch penrose drain was placed around the esophagus for traction. The hernia sac was then excised taking care to avoid injury to stomach, esophagus and vagal trunks. The esophagus was dissected circumferentially into the mediastinum in order to reduce tension, allowing the gastroesophageal junction to rest comfortably 4 cm within the abdominal cavity. The hernia was repaired posteriorly with a 6 inch V lock permanent running suture closing the hole in the bj starting inferiorly and sewing toward the esophagus. The repair looked good, not too tight.       The Falciform ligament was taken down and rotated behind the esophagus as a myofascial flap and sutured in place to the left isael and attached to the omentum above the spleen. Pars flaccida clear area was opened with harmonic scalpel. The lesser omental vessels were taken down to the lesser curvature of the stomach. The Ethicon STAS 60 blue cartridge was fired horizontally across the stomach to start the pouch. Three more firings vertically out through an opening at the angle of HIS. Hook cautery was used to make a small hole in the posterior wall at the distal gastric staple line. The ligament of Treitz was identified and measured 100 cm and brought up to the stomach pouch. The jejunum was sewn to the stomach with a V lock absorbable suture then a 36 F bougie was placed by Anaesthesia through the anastomosis after the stomach and jejunum were opened with cautery. The anastamosis was then completed with a second absorbable v lock suture. Harmonic was used to make a small opening in the mesentery of the biliary limb and it was then divided with the STAS white cartridge lateral to the gastric anastomosis. Pouch size approximately 30 mL. The Bougie was removed. The small bowel was then run 150 cm from the anastomosis and a loop of jejunum was brought up for the distal anastomosis using the triple-staple technique. Hook cautery was used to make a small opening in each limb. The STAS 60 white cartridge was fired, inside the lumen in opposite directions. The enterotomy was closed transversely with a STAS 60 white cartridge, finishing the distal anastomosis. The mesenteric defect was closed with a running absorbable v lock to prevent internal hernias. The abdomen was filled with saline. A bowel clamp was placed on the jejunum distal to the gastrojejunal anastomosis. An Endoscope was passed down through the mouth. The scope fit past the hiatal hernia repair without problems. The scope was pushed into the pouch. Old blood was removed with suction.  The pouch was inflated with air. There was no bubbling from any of the staple lines indicating they were airtight and watertight. The anastomosis was open approximately 12 mm. The scope was easily passed through the anastomosis into the jejunum. All of the mucosa looked healthy. The scope was withdrawn. The anastomosis was wrapped with omentum and tacked with a v lock suture. All of the fluid in the abdomen was removed with suction. All of the CO2 was released. The trocars were removed. Skin wounds were closed with 4-0 Monocryl dermal stitches and Derma+flex glue was applied. The needle and sponge count were correct. The patient tolerated the procedure well and went to recovery in stable condition.      Physician Signature: Electronically signed by Dr. James Woods MD, M.D.

## 2022-05-03 NOTE — ANESTHESIA PRE PROCEDURE
Department of Anesthesiology  Preprocedure Note       Name:  Ama Dasilva   Age:  46 y.o.  :  1970                                          MRN:  [id number]         Date:  5/3/2022      Surgeon: Irene Ferreira):  Surya Velasquez MD    Procedure: Procedure(s):  GASTRIC BYPASS MARCO ANTONIO-EN-Y LAPAROSCOPIC    Medications prior to admission:   Prior to Admission medications    Medication Sig Start Date End Date Taking? Authorizing Provider   omeprazole (PRILOSEC) 20 MG delayed release capsule Take 1 capsule by mouth Daily 22  Surya Velasquez MD   ondansetron (ZOFRAN-ODT) 4 MG disintegrating tablet Take 1 tablet by mouth every 8 hours as needed for Nausea or Vomiting 22   Surya Velasquez MD   Fexofenadine-Pseudoephedrine (ALLEGRA-D PO) Take by mouth daily as needed     Historical Provider, MD   fluticasone (FLONASE) 50 MCG/ACT nasal spray 1 spray by Each Nostril route daily    Historical Provider, MD       Current medications:    No current facility-administered medications for this visit. No current outpatient medications on file. Facility-Administered Medications Ordered in Other Visits   Medication Dose Route Frequency Provider Last Rate Last Admin    0.9 % sodium chloride infusion  25 mL IntraVENous PRN Surya Velasquez MD        ceFAZolin (ANCEF) 2000 mg in dextrose 3 % 50 mL IVPB (duplex)  2,000 mg IntraVENous On Call to [address] [place] [address], MD        lactated ringers infusion   IntraVENous Continuous Surya Velasquez  mL/hr at 22 0608 New Bag at 22 0608    scopolamine (TRANSDERM-SCOP) transdermal patch 1 patch  1 patch TransDERmal Q72H Surya Velasquez MD   1 patch at 22 9683    sodium chloride flush 0.9 % injection 5-40 mL  5-40 mL IntraVENous 2 times per day Surya Velasquez MD        sodium chloride flush 0.9 % injection 5-40 mL  5-40 mL IntraVENous PRN Surya Velasquez MD           Allergies:     Allergies   Allergen Reactions    Codeine Hives Problem List:    Patient Active Problem List   Diagnosis Code    Gastroesophageal reflux disease without esophagitis K21.9    Morbid obesity ([facility] E66.01       Past Medical History:        Diagnosis Date    PONV (postoperative nausea and vomiting)        Past Surgical History:        Procedure Laterality Date     SECTION      CHOLECYSTECTOMY      CYST REMOVAL      right great toe    UPPER GASTROINTESTINAL ENDOSCOPY N/A 2021    EGD BIOPSY performed by Candy Cazares MD at [address] History:    Social History     Tobacco Use    Smoking status: Never Smoker    Smokeless tobacco: Never Used   Substance Use Topics    Alcohol use: No                                Counseling given: Not Answered      Vital Signs (Current): There were no vitals filed for this visit.                                            BP Readings from Last 3 Encounters:   22 138/68   22 (!) 144/90   22 (!) 166/100       NPO Status:                                                                                 BMI:   Wt Readings from Last 3 Encounters:   22 256 lb (116.1 kg)   22 256 lb (116.1 kg)   22 255 lb (115.7 kg)     There is no height or weight on file to calculate BMI.    CBC:   Lab Results   Component Value Date    WBC 4.3 2022    RBC 4.41 2022    HGB 12.7 2022    HCT 39.6 2022    MCV 89.8 2022    RDW 13.0 2022     2022       CMP:   Lab Results   Component Value Date     2022    K 3.9 2022     2022    CO2 25 2022    BUN 9 2022    CREATININE 0.9 2022    GFRAA >60 2022    LABGLOM >60 2022    GLUCOSE 83 2022    PROT 7.8 2022    CALCIUM 9.6 2022    BILITOT 0.5 2022    ALKPHOS 110 2022    AST 22 2022    ALT 15 2022       POC Tests: No results for input(s): POCGLU, POCNA, POCK, POCCL, POCBUN, POCHEMO, POCHCT in the last 72 hours. Coags: No results found for: PROTIME, INR, APTT    HCG (If Applicable):   Lab Results   Component Value Date    PREGTESTUR NEGATIVE 05/03/2022        ABGs: No results found for: PHART, PO2ART, ELN7YNN, TZM4SSL, BEART, C5HHYCMC     Type & Screen (If Applicable):  No results found for: LABABO, LABRH    Drug/Infectious Status (If Applicable):  No results found for: HIV, HEPCAB    COVID-19 Screening (If Applicable): No results found for: COVID19        Anesthesia Evaluation  Patient summary reviewed   history of anesthetic complications ( PONV associated with the use of codeine which causes severe nausea and vomiting.): PONV. Airway: Mallampati: II     Neck ROM: full  Mouth opening: > = 3 FB Dental:      Comment: Dentition intact,  single missing upper molar bilaterally, denies any loose teeth. Pulmonary:Negative Pulmonary ROS and normal exam  breath sounds clear to auscultation                             Cardiovascular:Negative CV ROS  Exercise tolerance: good (>4 METS),   (+) hyperlipidemia      ECG reviewed  Rhythm: regular  Rate: normal                 ROS comment: EKG: Sinus  Rhythm rate 69, axis -5  WITHIN NORMAL LIMITS     Neuro/Psych:   Negative Neuro/Psych ROS              GI/Hepatic/Renal:   (+) GERD:, morbid obesity ( Super morbid obesity, BMI; 40.10.)          Endo/Other: Negative Endo/Other ROS                    Abdominal:   (+) obese (Super morbid obesity, BMI: 40.10.),           Vascular: negative vascular ROS. Other Findings:               Anesthesia Plan      general     ASA 3       Induction: intravenous. BIS and continuous noninvasive hemodynamic monitor  MIPS: Postoperative opioids intended. Anesthetic plan and risks discussed with patient. Plan discussed with CRNA.     Attending anesthesiologist reviewed and agrees with Preprocedure content      PHILIP Savage - CRNA          Adonay Dumont MD   5/3/2022

## 2022-05-04 VITALS
SYSTOLIC BLOOD PRESSURE: 152 MMHG | HEART RATE: 88 BPM | HEIGHT: 67 IN | WEIGHT: 256 LBS | TEMPERATURE: 98.2 F | DIASTOLIC BLOOD PRESSURE: 88 MMHG | OXYGEN SATURATION: 97 % | BODY MASS INDEX: 40.18 KG/M2 | RESPIRATION RATE: 18 BRPM

## 2022-05-04 LAB
ANION GAP SERPL CALCULATED.3IONS-SCNC: 9 MMOL/L (ref 7–16)
BUN BLDV-MCNC: 10 MG/DL (ref 6–20)
CALCIUM SERPL-MCNC: 9.1 MG/DL (ref 8.6–10.2)
CHLORIDE BLD-SCNC: 102 MMOL/L (ref 98–107)
CO2: 26 MMOL/L (ref 22–29)
CREAT SERPL-MCNC: 1 MG/DL (ref 0.5–1)
GFR AFRICAN AMERICAN: >60
GFR NON-AFRICAN AMERICAN: >60 ML/MIN/1.73
GLUCOSE BLD-MCNC: 127 MG/DL (ref 74–99)
HCT VFR BLD CALC: 36.6 % (ref 34–48)
HEMOGLOBIN: 11.5 G/DL (ref 11.5–15.5)
MCH RBC QN AUTO: 28.5 PG (ref 26–35)
MCHC RBC AUTO-ENTMCNC: 31.4 % (ref 32–34.5)
MCV RBC AUTO: 90.6 FL (ref 80–99.9)
PDW BLD-RTO: 13.3 FL (ref 11.5–15)
PLATELET # BLD: 305 E9/L (ref 130–450)
PMV BLD AUTO: 9.9 FL (ref 7–12)
POTASSIUM SERPL-SCNC: 4.4 MMOL/L (ref 3.5–5)
RBC # BLD: 4.04 E12/L (ref 3.5–5.5)
SODIUM BLD-SCNC: 137 MMOL/L (ref 132–146)
WBC # BLD: 12.2 E9/L (ref 4.5–11.5)

## 2022-05-04 PROCEDURE — A4216 STERILE WATER/SALINE, 10 ML: HCPCS | Performed by: SURGERY

## 2022-05-04 PROCEDURE — 6360000002 HC RX W HCPCS: Performed by: SURGERY

## 2022-05-04 PROCEDURE — 99024 POSTOP FOLLOW-UP VISIT: CPT | Performed by: SURGERY

## 2022-05-04 PROCEDURE — 85027 COMPLETE CBC AUTOMATED: CPT

## 2022-05-04 PROCEDURE — 36415 COLL VENOUS BLD VENIPUNCTURE: CPT

## 2022-05-04 PROCEDURE — 80048 BASIC METABOLIC PNL TOTAL CA: CPT

## 2022-05-04 PROCEDURE — 2580000003 HC RX 258: Performed by: SURGERY

## 2022-05-04 RX ADMIN — SODIUM CHLORIDE, POTASSIUM CHLORIDE, SODIUM LACTATE AND CALCIUM CHLORIDE: 600; 310; 30; 20 INJECTION, SOLUTION INTRAVENOUS at 07:39

## 2022-05-04 RX ADMIN — MORPHINE SULFATE 2 MG: 2 INJECTION, SOLUTION INTRAMUSCULAR; INTRAVENOUS at 06:42

## 2022-05-04 RX ADMIN — SODIUM CHLORIDE, POTASSIUM CHLORIDE, SODIUM LACTATE AND CALCIUM CHLORIDE: 600; 310; 30; 20 INJECTION, SOLUTION INTRAVENOUS at 00:41

## 2022-05-04 RX ADMIN — ONDANSETRON 4 MG: 2 INJECTION INTRAMUSCULAR; INTRAVENOUS at 06:42

## 2022-05-04 RX ADMIN — PROCHLORPERAZINE EDISYLATE 10 MG: 5 INJECTION INTRAMUSCULAR; INTRAVENOUS at 10:14

## 2022-05-04 RX ADMIN — MORPHINE SULFATE 4 MG: 4 INJECTION, SOLUTION INTRAMUSCULAR; INTRAVENOUS at 10:13

## 2022-05-04 ASSESSMENT — PAIN DESCRIPTION - LOCATION: LOCATION: ABDOMEN

## 2022-05-04 ASSESSMENT — PAIN DESCRIPTION - DESCRIPTORS: DESCRIPTORS: DISCOMFORT

## 2022-05-04 ASSESSMENT — PAIN SCALES - GENERAL
PAINLEVEL_OUTOF10: 3
PAINLEVEL_OUTOF10: 7
PAINLEVEL_OUTOF10: 3
PAINLEVEL_OUTOF10: 5
PAINLEVEL_OUTOF10: 8

## 2022-05-04 ASSESSMENT — PAIN DESCRIPTION - PAIN TYPE: TYPE: SURGICAL PAIN

## 2022-05-04 ASSESSMENT — PAIN - FUNCTIONAL ASSESSMENT: PAIN_FUNCTIONAL_ASSESSMENT: ACTIVITIES ARE NOT PREVENTED

## 2022-05-04 NOTE — CARE COORDINATION
CM note: Pt is a post op day 1 gastric bypass, seen by surgery and was c/o pain so discharge will be held for another day. no identified discharge needs per IDR and chart review. Pt lives with family, is independent with ADLs, has a PCP and medical insurance for medications, family will transport home.

## 2022-05-04 NOTE — PROGRESS NOTES
GENERAL SURGERY  DAILY PROGRESS NOTE  5/4/2022  CC: Status post laparoscopic Christ-en-Y gastric bypass and hiatal hernia repair 5/3    Subjective:  Pain well controlled. Nausea but no vomiting. Has been tolerating her clear liquid diet. Adequate p.o. intake    Objective:  BP (!) 161/95   Pulse 96   Temp 98.3 °F (36.8 °C) (Oral)   Resp 18   Ht 5' 7\" (1.702 m)   Wt 256 lb (116.1 kg)   SpO2 97%   BMI 40.10 kg/m²     GENERAL:  Laying in bed, awake, alert, cooperative, no apparent distress  HEAD: Normocephalic, atraumatic  EYES: No sclera icterus, pupils equal  LUNGS:  No increased work of breathing  CARDIOVASCULAR:  regular rate  ABDOMEN:  Soft, appropriately tender to palpation, non-distended, incisions clean dry and intact  EXTREMITIES: No edema or swelling  SKIN: Warm and dry, no rashes or lesions    Assessment/Plan:  46 y.o. female Status post laparoscopic Christ-en-Y gastric bypass and hiatal hernia repair 5/3    Continue bariatric clear liquid diet  Pain/nausea control  Discharge planning    Electronically signed by Ben Silveira MD on 5/4/2022 at 6:48 AM    Complaining of pain today.  Surgery was without issue so I have little concern of any issue, may keep one extra day for comfort

## 2022-05-04 NOTE — PLAN OF CARE
Problem: Discharge Planning  Goal: Discharge to home or other facility with appropriate resources  5/3/2022 2030 by Irma Jason RN  Outcome: Progressing     Problem: Pain  Goal: Verbalizes/displays adequate comfort level or baseline comfort level  5/3/2022 2030 by Irma Jason RN  Outcome: Progressing     Problem: Safety - Adult  Goal: Free from fall injury  5/3/2022 2030 by Irma Jason RN  Outcome: Progressing     Problem: ABCDS Injury Assessment  Goal: Absence of physical injury  5/3/2022 2030 by Irma Jason RN  Outcome: Progressing

## 2022-05-04 NOTE — PROGRESS NOTES
Department of Internal Medicine      HISTORY OF PRESENT ILLNESS:      The patient is a 46 y.o. female who presents with having elective laparoscopic Christ-en-Y gastric bypass with large symptomatic hiatal hernia repair, mobilization and placement of myocutaneous flap and omental flap. Patient is seen postop. Patient has expected postop discomfort. She denies any problem chest pain, dizziness or unusual shortness of breath. Temperature is 98.4 with heart rate of 68 blood pressure 143/86. O2 sat  95% on room air at rest.    2022  Patient seen examined on medical surgical floor. Patient complained of some nausea this morning. Patient has expected postop discomfort. Patient denies any problems any chest pain, dizziness or unusual shortness of breath. BUN/creatinine was 10/1.0 with patient WBC 12.2 and hemoglobin 11.5. Normal electrolytes. Temperature is 98.3 with heart rate in 96 and blood pressure 161/95. O2 sat 97% on room air at rest.  General surgery note reviewed. Patient's nausea improves patient probably will be discharged home later today. Past Medical History:    Past Medical History:   Diagnosis Date    PONV (postoperative nausea and vomiting)      Past Surgical History:    Past Surgical History:   Procedure Laterality Date     SECTION      CHOLECYSTECTOMY      CYST REMOVAL      right great toe    CHRIST-EN-Y GASTRIC BYPASS N/A 5/3/2022    GASTRIC BYPASS CHRIST-EN-Y LAPAROSCOPIC performed by Howie Nicolas MD at [address] [place] [address] 2021    EGD BIOPSY performed by Howie Nicolas MD at [place] [address]       Medications Prior to Admission:    @  Prior to Admission medications    Medication Sig Start Date End Date Taking?  Authorizing Provider   omeprazole (PRILOSEC) 20 MG delayed release capsule Take 1 capsule by mouth Daily 22  Howie Nicolas MD   ondansetron (ZOFRAN-ODT) 4 MG disintegrating tablet Take 1 tablet by mouth every 8 hours as needed for Nausea or Vomiting 4/27/22   Jair Stafford MD   Fexofenadine-Pseudoephedrine (ALLEGRA-D PO) Take by mouth daily as needed     Historical Provider, MD   fluticasone (FLONASE) 50 MCG/ACT nasal spray 1 spray by Each Nostril route daily    Historical Provider, MD       Allergies:  Codeine    Social History:   Social History     Socioeconomic History    Marital status:      Spouse name: Not on file    Number of children: Not on file    Years of education: Not on file    Highest education level: Not on file   Occupational History    Not on file   Tobacco Use    Smoking status: Never Smoker    Smokeless tobacco: Never Used   Substance and Sexual Activity    Alcohol use: No    Drug use: No    Sexual activity: Not on file   Other Topics Concern    Not on file   Social History Narrative    Not on file     Social Determinants of Health     Financial Resource Strain:     Difficulty of Paying Living Expenses: Not on file   Food Insecurity:     Worried About Running Out of Food in the Last Year: Not on file    Anila of Food in the Last Year: Not on file   Transportation Needs:     Lack of Transportation (Medical): Not on file    Lack of Transportation (Non-Medical):  Not on file   Physical Activity:     Days of Exercise per Week: Not on file    Minutes of Exercise per Session: Not on file   Stress:     Feeling of Stress : Not on file   Social Connections:     Frequency of Communication with Friends and Family: Not on file    Frequency of Social Gatherings with Friends and Family: Not on file    Attends Jewish Services: Not on file    Active Member of Clubs or Organizations: Not on file    Attends Club or Organization Meetings: Not on file    Marital Status: Not on file   Intimate Partner Violence:     Fear of Current or Ex-Partner: Not on file    Emotionally Abused: Not on file    Physically Abused: Not on file    Sexually Abused: Not on file   Housing Stability:  Unable to Pay for Housing in the Last Year: Not on file    Number of Places Lived in the Last Year: Not on file    Unstable Housing in the Last Year: Not on file       Family History:   No family history on file. REVIEW OF SYSTEMS:    Gen: Patient denies any lightheadedness or dizziness. No LOC or syncope. No fevers or chills. HEENT: No earache, sore throat or nasal congestion. Resp: Denies cough, hemoptysis or sputum production. Cardiac: Denies chest pain, SOB, diaphoresis or palpitations. GI: + Heartburn. No nausea, vomiting, diarrhea or constipation. No melena or hematochezia. : No urinary complaints, dysuria, hematuria or frequency. MSK: No extremity weakness, paralysis or paresthesias. PHYSICAL EXAM:    Vitals:  BP (!) 161/95   Pulse 96   Temp 98.3 °F (36.8 °C) (Oral)   Resp 18   Ht 5' 7\" (1.702 m)   Wt 256 lb (116.1 kg)   SpO2 97%   BMI 40.10 kg/m²     General:  This is a 46 y.o. yo female who is alert and oriented in expected postop distress  HEENT:  Head is normocephalic and atraumatic, PERRLA, EOMI, mucus membranes moist with no pharyngeal erythema or exudate. Neck:  Supple with no carotid bruits, JVD or thyromegaly.   No cervical adenopathy  CV:  Regular rate and rhythm, no murmurs  Lungs:  Clear to auscultation bilaterally with no wheezes, rales or rhonchi  Abdomen:  + Postop abdomen  and mildly distended, bowel sounds present  Extremities:  No edema, peripheral pulses intact bilaterally  Neuro:  Cranial nerves II-XII grossly intact; motor and sensory function intact with no focal deficits  Skin:  No rashes, lesions or wounds      DATA:  CBC with Differential:    Lab Results   Component Value Date    WBC 12.2 05/04/2022    RBC 4.04 05/04/2022    HGB 11.5 05/04/2022    HCT 36.6 05/04/2022     05/04/2022    MCV 90.6 05/04/2022    MCH 28.5 05/04/2022    MCHC 31.4 05/04/2022    RDW 13.3 05/04/2022    LYMPHOPCT 18.7 03/25/2017    MONOPCT 11.2 03/25/2017 BASOPCT 0.1 03/25/2017    MONOSABS 0.82 03/25/2017    LYMPHSABS 1.37 03/25/2017    EOSABS 0.06 03/25/2017    BASOSABS 0.01 03/25/2017     CMP:    Lab Results   Component Value Date     05/04/2022    K 4.4 05/04/2022     05/04/2022    CO2 26 05/04/2022    BUN 10 05/04/2022    CREATININE 1.0 05/04/2022    GFRAA >60 05/04/2022    LABGLOM >60 05/04/2022    GLUCOSE 127 05/04/2022    PROT 7.8 04/27/2022    LABALBU 4.3 04/27/2022    CALCIUM 9.1 05/04/2022    BILITOT 0.5 04/27/2022    ALKPHOS 110 04/27/2022    AST 22 04/27/2022    ALT 15 04/27/2022     Magnesium:  No results found for: MG  Phosphorus:  No results found for: PHOS  PT/INR:  No results found for: PROTIME, INR  Troponin:  No results found for: TROPONINI  U/A:  No results found for: NITRITE, COLORU, PROTEINU, PHUR, LABCAST, WBCUA, RBCUA, MUCUS, TRICHOMONAS, YEAST, BACTERIA, CLARITYU, SPECGRAV, LEUKOCYTESUR, UROBILINOGEN, BILIRUBINUR, BLOODU, GLUCOSEU, AMORPHOUS  ABG:  No results found for: PH, PCO2, PO2, HCO3, BE, THGB, TCO2, O2SAT  HgBA1c:  No results found for: LABA1C  FLP:    Lab Results   Component Value Date    TRIG 95 11/23/2021     TSH:  No results found for: TSH  IRON:  No results found for: IRON  LIPASE:  No results found for: LIPASE    ASSESSMENT AND PLAN:      Patient Active Problem List    Diagnosis Date Noted    S/P gastric bypass 5/3/2022 05/03/2022    Morbid obesity ([address] 12/15/2021    Gastroesophageal reflux disease without esophagitis      Plan:   Admitted to medical surgical floor.   Home medications reviewed  Monitor heart rate, blood pressure, O2 saturation    If nausea improves discharge home when okay with general surgery    Tiny House DO, D.O.  5/4/2022  9:29 AM

## 2022-05-04 NOTE — DISCHARGE SUMMARY
Physician Discharge Summary     Hu Rodriguez  [id number]    Admit date: 5/3/2022    Discharge date and time: 5/4/2022     Admitting Physician: Kumar Green MD     Condition: stable    Patient Active Problem List   Diagnosis    Gastroesophageal reflux disease without esophagitis    Morbid obesity ([address]    S/P gastric bypass       Hospital Course: Hu Rodriguez is a 46 y.o. female one day post-op from a laparoscopic Gastric Bypass. She did well with surgery, pain has been well controlled over night, walking well in the halls. Labs normal. Jane out. She is tolerating the Bariatric clear liquid diet with no nausea, no pain. Incisions all clean and dry, glue in place. Lab Results   Component Value Date    WBC 12.2 05/04/2022    HGB 11.5 05/04/2022     05/04/2022     05/04/2022     05/04/2022    K 4.4 05/04/2022    BUN 10 05/04/2022    CREATININE 1.0 05/04/2022    GLUCOSE 127 05/04/2022    LABGLOM >60 05/04/2022    LABALBU 4.3 04/27/2022    PROT 7.8 04/27/2022    CALCIUM 9.1 05/04/2022    BILITOT 0.5 04/27/2022    ALKPHOS 110 04/27/2022    AST 22 04/27/2022    ALT 15 04/27/2022       Discharge Exam:   VITALS: BP (!) 152/88   Pulse 88   Temp 98.2 °F (36.8 °C) (Oral)   Resp 18   Ht 5' 7\" (1.702 m)   Wt 256 lb (116.1 kg)   SpO2 97%   BMI 40.10 kg/m²     General appearance: alert, appears stated age and cooperative  Neck: no adenopathy, no carotid bruit, no JVD, supple, symmetrical, trachea midline and thyroid not enlarged, symmetric, no tenderness/mass/nodules  Lungs: clear to auscultation bilaterally  Heart: regular rate and rhythm, S1, S2 normal, no murmur, click, rub or gallop  Abdomen: Incisions clean and dry, surgical glue in place. Extremities: extremities normal, atraumatic, no cyanosis or edema    Disposition: home    Patient Instructions:   Hold medicines for blood pressure, diabetes and cholesterol. Do not take diuretics at home.  Take Beta-blockers but decrease the dose by half. Ok to restart Aspirin and other blood thinners. Medication List      CONTINUE taking these medications    ALLEGRA-D PO     fluticasone 50 MCG/ACT nasal spray  Commonly known as: FLONASE     omeprazole 20 MG delayed release capsule  Commonly known as: PRILOSEC  Take 1 capsule by mouth Daily     ondansetron 4 MG disintegrating tablet  Commonly known as: ZOFRAN-ODT  Take 1 tablet by mouth every 8 hours as needed for Nausea or Vomiting          Activity:  no lifting, or strenuous exercise for one or two weeks. No driving for one week. Diet:  Bariatric clear liquid for 2 days, then bariatric full liquids for 2 weeks, 1 oz every 15 minutes. Wound Care: shower tomorrow then leave the incisions open to air     Follow-up with Dr. Silvano Martinez in 2 weeks.     Signed:  Kumar Green MD  5/4/2022  1:56 PM

## 2022-05-05 ENCOUNTER — TELEPHONE (OUTPATIENT)
Dept: BARIATRICS/WEIGHT MGMT | Age: 52
End: 2022-05-05

## 2022-05-05 VITALS — WEIGHT: 255 LBS | HEIGHT: 67 IN | BODY MASS INDEX: 40.02 KG/M2

## 2022-05-05 NOTE — TELEPHONE ENCOUNTER
Post op questions:    Nausea/vomiting present (YES/NO): Some nausea - no vomiting    Fever > 101/chills present (YES/NO): No    Tolerating liquids (YES/NO): Yes  Ounces per day: 16 oz  Protein shakes: Not yet    Taking post op medications, Carafate/Omeprazole & Zofran (YES/NO): Yes    Is patient up and walking (YES/NO): Yes    Having bowel movements (YES/NO): Not yet    Are incisions healing well (YES/NO): Yes    Having any problems or concerns that need to be addressed:  Slight headache

## 2022-05-05 NOTE — PATIENT INSTRUCTIONS
The [address] [place] [address] [facility]  Dietary Follow-up Appointment Instructions    Zoey Lozano Roman   Date: 5/5/2022     The RD / LD reviewed the following instructions with the patient and handouts have been given. Pt. is able to verbalize instruction and has been instructed to call with any problems or complications. If patient is not able to comply with the dietary or supplement instructions given pt. is instructed to call the [facility] at [phone]. Patient has been instructed to continue to follow a low-fat diet from today's date until the day before surgery. Patient has been instructed to drink 64 oz. of water daily eliminating carbonated and caffeinated beverages.     ________________________________________________________________________

## 2022-05-05 NOTE — PROGRESS NOTES
Patient attended a 3 Hour Initial Nutrition Consult Class for RYGB on 4/27/22. Patient was able to verbalize understanding of the class. Anupamagriselda Stephens and [facility]  Nutrition History and Physical     Shalonda Owens    Surgery Type: RYGB  Today's Date: 5/5/22    yes  Patient attended a 3 hour nutrition education class on 4/27/22, and was given written educational material.  Patient signed and verbalized understanding of nutrition therapy for Bariatric Surgery. Assessment:              Vitals:    05/05/22 1212   Weight: 255 lb (115.7 kg)   Height: 5' 7\" (1.702 m)    Height: 5' 7\" (1.702 m) Weight: 255 lb (115.7 kg)   BMI:Body mass index is 39.94 kg/m². Food Allergies and Allergies: No    Food Intolerances: No   Eating Problems:No  Chewing Problems:No  Swallowing Problems:No    Current Vitamins/Supplements and Medications:  Current Outpatient Medications:     omeprazole (PRILOSEC) 20 MG delayed release capsule, Take 1 capsule by mouth Daily, Disp: 30 capsule, Rfl: 12    ondansetron (ZOFRAN-ODT) 4 MG disintegrating tablet, Take 1 tablet by mouth every 8 hours as needed for Nausea or Vomiting, Disp: 15 tablet, Rfl: 0    Fexofenadine-Pseudoephedrine (ALLEGRA-D PO), Take by mouth daily as needed , Disp: , Rfl:     fluticasone (FLONASE) 50 MCG/ACT nasal spray, 1 spray by Each Nostril route daily, Disp: , Rfl:   _    Please check all that apply:  Yes - Patient lost 10% of excess body weight prior to surgery  Yes - Patient  is able to verbalize a Bariatric Full Liquid Diet. Yes - Patient is able to verbalize the usage & importance of Protein Supplements. Yes- Patient purchased 3 month supply of protein vitamins and calcium. YES - Patient is instructed to follow a low-fat diet from now until surgery date. YES - Patient is instructed to take 30 grams of a protein supplement from  now until surgery date in addition to low-fat diet guidelines.   YES - Patient is instructed to consume 64 ounces of water daily from now until surgery date.  ________________________________________________________________________  Yes - Patient did  lose 10% of excess body weight prior to surgery  ________________________________________________________________________  Yes - Patient did purchase 3 month supply of protein, vitamins and Calcium.     Comments:   [facility] Supplements

## 2022-05-06 ENCOUNTER — TELEPHONE (OUTPATIENT)
Dept: BARIATRICS/WEIGHT MGMT | Age: 52
End: 2022-05-06

## 2022-05-06 NOTE — TELEPHONE ENCOUNTER
[facility]:  RYGB or [facility] - Dietary Phone Follow-up Form:  Sx Date: 5/3/22  rs/p RYGB      Current Diet:_____Bar Full Liquid Diet_________________________       Patient's symptoms include the following:         Pt states  he / she has the following symptoms:    Nausea -  yes, - Pt reports minimal.  Vomiting -  No  Diarrhea -  no  Abdominal Cramping -  no  Gas -  yes,  Pt stated she started passing gas yesterday and today. Dizziness -  no   Fever - no //  Pt physically took his / her temperature today - No  Constipation -  No - Denies  Bloody Stools - no  Tarry Stools - no  Shakiness -  no   Low Blood Sugar -  no  Feels Faint -  no   Excessive Salvia -  no  Other  - None  Pt started his / her Colace 100 mgs twice daily -  Pt states she only took the Colace once yesterday. Pt was instructed to start taking her  - Colace - 100 mgs twice daily  Pt is taking PPI Medications or Carafate as instructed -  Pt states she is taking her Omeprazole     Hunger during the liquid phase no   Reports no restriction during the liquid phase no  Reports moderate restriction during liquid phase no  Reports severe restriction during liquid phase no  Can't tolerate liquids no  Food gets stuck frequently no  - Colace felt suck yesterday    Reflux Symptoms no       24 Hour Recall: Bar Cl Liquid Diet - 1 oz of fluid every 15 minutes  Breakfast: 3 oz SF Gatorade  Snack: Water  Lunch:  3 oz Broth  Snack: 3 oz Diet Jell-O  Dinner: 3 oz Beef Broth  Snack: SF Popsicle  Water Intake:24 oz  Other Beverage: SF Gatorade  Pt  states denies dehydration on today's date - Pt states today she does feel dry. Enc pt to increase water. Pt verbalized understanding. Pt states it is improving she has already has 24 oz of water in by this afternoon. Pt states she will continue to increase water intake. Protein Supplement: Roger Fregoso reviewed pt can start his / her protein supplement on 5/6/22.   Roger Fregoso reviewed how pt can mix his / her protein supplement - 2 scoops Nectar mixed in 12 oz Fat Free Fair Life Milk or  12 oz of Water broken down into 4 meals 3 oz in size. Pt verbalized understanding. How many times a day do you take your Protein Supplement: 4    Instructed per Standing Orders: yes, Starting on 5/6/22 Bariatric Full Liquid Diet . [address] Ildefonso reviewed Bar Full Liquid Diet and reinforced diet concepts. Pt has education book and handouts and states he/ she is following the instruction given. Use of protein supplement was reviewed with how to mix his / her protein supplement. Pt verbalized understanding. Pt instructed to call with any dietary questions.      POC D/T problem noted above: None    Surgeon Norified:no    Patient Response:  Verbalized understanding of the above instruction: yes,   Will keep detailed food records for 2 week f/u appt: yes,   Will return to [facility] for his her 2 week f/u appointment - Reminded pt to have labs drawn    Pt has an appt with PCP on 5/9/22  Pt will have blood work done

## 2022-05-09 ENCOUNTER — HOSPITAL ENCOUNTER (OUTPATIENT)
Age: 52
Discharge: HOME | End: 2022-05-09
Payer: COMMERCIAL

## 2022-05-09 ENCOUNTER — TELEPHONE (OUTPATIENT)
Dept: BARIATRICS/WEIGHT MGMT | Age: 52
End: 2022-05-09

## 2022-05-09 DIAGNOSIS — K91.2 MALNUTRITION FOLLOWING GASTROINTESTINAL SURGERY: ICD-10-CM

## 2022-05-09 LAB
ALBUMIN SERPL-MCNC: 3.9 G/DL (ref 3.5–5.2)
ALP BLD-CCNC: 92 U/L (ref 35–104)
ALT SERPL-CCNC: 16 U/L (ref 0–32)
ANION GAP SERPL CALCULATED.3IONS-SCNC: 13 MMOL/L (ref 7–16)
AST SERPL-CCNC: 15 U/L (ref 0–31)
BILIRUB SERPL-MCNC: 0.5 MG/DL (ref 0–1.2)
BUN BLDV-MCNC: 13 MG/DL (ref 6–20)
CALCIUM SERPL-MCNC: 9.5 MG/DL (ref 8.6–10.2)
CHLORIDE BLD-SCNC: 101 MMOL/L (ref 98–107)
CO2: 25 MMOL/L (ref 22–29)
CREAT SERPL-MCNC: 0.9 MG/DL (ref 0.5–1)
GFR AFRICAN AMERICAN: >60
GFR NON-AFRICAN AMERICAN: >60 ML/MIN/1.73
GLUCOSE BLD-MCNC: 98 MG/DL (ref 74–99)
HCT VFR BLD CALC: 36.1 % (ref 34–48)
HEMOGLOBIN: 12.3 G/DL (ref 11.5–15.5)
MCH RBC QN AUTO: 28.9 PG (ref 26–35)
MCHC RBC AUTO-ENTMCNC: 34.1 % (ref 32–34.5)
MCV RBC AUTO: 84.9 FL (ref 80–99.9)
PDW BLD-RTO: 12.6 FL (ref 11.5–15)
PLATELET # BLD: 349 E9/L (ref 130–450)
PMV BLD AUTO: 9.5 FL (ref 7–12)
POTASSIUM SERPL-SCNC: 3.6 MMOL/L (ref 3.5–5)
RBC # BLD: 4.25 E12/L (ref 3.5–5.5)
SODIUM BLD-SCNC: 139 MMOL/L (ref 132–146)
TOTAL PROTEIN: 7.4 G/DL (ref 6.4–8.3)
WBC # BLD: 6 E9/L (ref 4.5–11.5)

## 2022-05-09 PROCEDURE — 36415 COLL VENOUS BLD VENIPUNCTURE: CPT

## 2022-05-09 PROCEDURE — 80053 COMPREHEN METABOLIC PANEL: CPT

## 2022-05-09 PROCEDURE — 85027 COMPLETE CBC AUTOMATED: CPT

## 2022-05-09 NOTE — TELEPHONE ENCOUNTER
Post op questions:    Nausea/vomiting present (YES/NO): No    Fever > 101/chills present (YES/NO): No    Tolerating liquids (YES/NO): Yes  Ounces per day: 48 oz  Protein shakes: Yes    Taking post op medications, Carafate/Omeprazole & Zofran (YES/NO): Yes    Is patient up and walking (YES/NO): Yes    Having bowel movements (YES/NO): Not as of current    Are incisions healing well (YES/NO): Yes    Having any problems or concerns that need to be addressed:  None as of current

## 2022-05-11 ENCOUNTER — INITIAL CONSULT (OUTPATIENT)
Dept: BARIATRICS/WEIGHT MGMT | Age: 52
End: 2022-05-11

## 2022-05-11 ENCOUNTER — OFFICE VISIT (OUTPATIENT)
Dept: BARIATRICS/WEIGHT MGMT | Age: 52
End: 2022-05-11
Payer: COMMERCIAL

## 2022-05-11 VITALS
RESPIRATION RATE: 20 BRPM | TEMPERATURE: 97.9 F | SYSTOLIC BLOOD PRESSURE: 167 MMHG | BODY MASS INDEX: 39.08 KG/M2 | WEIGHT: 249 LBS | DIASTOLIC BLOOD PRESSURE: 95 MMHG | HEIGHT: 67 IN | HEART RATE: 88 BPM

## 2022-05-11 VITALS — WEIGHT: 249 LBS | BODY MASS INDEX: 39.08 KG/M2 | HEIGHT: 67 IN

## 2022-05-11 DIAGNOSIS — Z71.3 DIETARY COUNSELING: Primary | ICD-10-CM

## 2022-05-11 DIAGNOSIS — Z09 POSTOPERATIVE EXAMINATION: Primary | ICD-10-CM

## 2022-05-11 PROCEDURE — 99024 POSTOP FOLLOW-UP VISIT: CPT | Performed by: SURGERY

## 2022-05-11 PROCEDURE — 99212 OFFICE O/P EST SF 10 MIN: CPT

## 2022-05-11 PROCEDURE — 99999 PR OFFICE/OUTPT VISIT,PROCEDURE ONLY: CPT

## 2022-05-11 NOTE — PROGRESS NOTES
Meliton Guzman [facility]  5/11/2022  Laparoscopic Christ-en- Y Gastric Bypass  Two weeks Post-Op Follow-up. Subjective:   Vianey Nur is a 46 y.o. female is two weeks post Laparoscopic Christ-en-Y Gastric Bypass. The patient is not having any pain. Reports no problems with swallowing liquids, bowel movements, voiding, or the wounds. She is not having swallowing difficulty, is compliant most of the time with the multivitamins and calcium + Vit D. She is meeting fluid recommendations of at least 64 ounces per day and is meeting protein recommendations. Exercise: no regular exercise. 249 lb (112.9 kg) Today's weight represents a weight loss of 6 pounds since surgery. Prior to Admission medications    Medication Sig Start Date End Date Taking?  Authorizing Provider   omeprazole (PRILOSEC) 20 MG delayed release capsule Take 1 capsule by mouth Daily 4/27/22 4/27/23 Yes Kumar Green MD   ondansetron (ZOFRAN-ODT) 4 MG disintegrating tablet Take 1 tablet by mouth every 8 hours as needed for Nausea or Vomiting 4/27/22  Yes Kumar Green MD   Fexofenadine-Pseudoephedrine (ALLEGRA-D PO) Take by mouth daily as needed    Yes Historical Provider, MD   fluticasone (FLONASE) 50 MCG/ACT nasal spray 1 spray by Each Nostril route daily   Yes Historical Provider, MD        Physical exam:  VITALS: BP (!) 167/95 (Site: Left Lower Arm, Position: Sitting, Cuff Size: Medium Adult)   Pulse 88   Temp 97.9 °F (36.6 °C) (Temporal)   Resp 20   Ht 5' 7\" (1.702 m)   Wt 249 lb (112.9 kg)   BMI 39.00 kg/m²    General appearance: alert, appears stated age and cooperative  Head: Normocephalic, without obvious abnormality, atraumatic  Neck: no adenopathy, no carotid bruit, no JVD, supple, symmetrical, trachea midline and thyroid not enlarged, symmetric, no tenderness/mass/nodules  Lungs: clear to auscultation bilaterally  Heart: regular rate and rhythm  Abdomen:  Incisions healing well, surgical glue in place, no allergic reaction, no cellulitis  Extremities: extremities normal, atraumatic, no cyanosis or edema    Assessment:    Doing well two weeks post laparoscopic gastric bypass. Plan:   Keep up good work. Walk as much as possible but keep your legs elevated when sitting. Continue deep breathing exercizes. Transition to the high protein Pureed-liquid diet. Continue drinking 1 oz every 10-15 minutes to prevent dehydration. Slowly increase this amount as tolerated. Aim for 60 gm Protein and 90 oz of liquids per day. Slow down if you feel chest pressure, acid or fullness. Track protein and fluid intake and bring to your next appointment. Follow up in 4 weeks and call the clinic if questions arise in the meantime. Repeat labs in one month. Make sure the bowel move daily by taking fiber such as Metamucil.       Physician Signature: Electronically signed by Dr. Kev Montoya MD

## 2022-05-11 NOTE — PROGRESS NOTES
Patient is 2 wk LRYGB. Incisions are healing well. Water intake is around 64 oz daily. Protein through shakes and foods. Bowel movements are good. Scheduled with Alyse Lopez for dietary.

## 2022-05-11 NOTE — PATIENT INSTRUCTIONS
Please continue to take your vitamin and mineral supplements as instructed. If you received a blood work prescription today for laboratory monitoring due prior to your next routine follow-up visit, please have this blood work obtained 10 to 14 days prior to your next visit. It is important to fast for 12 hours prior to routine weight loss surgery blood work, EXCEPT for drinking water, to ensure accuracy of results. Please report nausea, vomiting, abdominal pain, or any other problems you experience to your surgeon. For problems related to weight loss surgery, it is best to go to [address] [place] [address] Emergency Department and have your surgeon paged.

## 2022-05-11 NOTE — PROGRESS NOTES
Medical Nutrition Therapy (MNT) Assessment     Pt. Name: Saida Owens   Date:5/11/2022  F/U Appt: Sx Type 2 week rygb     Food Records Kept: Yes  24Hour Recall Completed at Office:No    Ht 5' 7\" (1.702 m)   Wt 249 lb (112.9 kg)   BMI 39.00 kg/m²  Height: 5' 7\" (1.702 m) Weight: 249 lb (112.9 kg)   IBW:ideal body weight   163 lbs  % EBWL: 6%     Wt Readings from Last 3 Encounters:   05/11/22 249 lb (112.9 kg)   05/11/22 249 lb (112.9 kg)   05/03/22 256 lb (116.1 kg)                     Protein supplements: Pt. is currently using the following protein supplement Nectar and consuming the following grams of protein 75. Rd / Ld reviewed with patient based on 1.0 gram per kg of IBW patient needs 74-84 grams of protein total daily.       Subjective:                   Current MNT:       Bariatric full liquid diet with MVI, Calcium & Protein Supplements     MNT Advanced to:     Bariatric puree diet with MVI, Calcium & Protein Supplements      Allergies and Food Allergies and Food Intolerances:none    Nutritional Data    No - Nausea  No - Vomiting    [facility] Bowel Protocol:  Patient states he / she has the following bowel movements per week 1  no - Diarrhea  No - Steatorrhea  Yes - Constipation  When was your last bowel movement today  How much plain water are you drinking daily 54-58 oz  What other beverages / fluids are you drinking daily and the amount none  Yes - Are you taking Colace daily  What amount of Colace are you taking daily 2  No - Are you taking Sugar Free Chewable Fiber Gummies  What amount of Sugar Free Chewable Fiber Gummies are you taking daily none  No Patient was provided today the Constipation Handout  Yes Rd Ld reinforced pt needs to consume the following - Water Intake should be 64 oz , Fiber Chews prn, Dietary Fiber Intake 25g        No - Hair loss   No - Weight regain       No - Acid Reflux  No - Dumping Syndrome      No - Food gets stuck  No - Are you eating solids - should not be eating solids until 6 weeks post-op. no - Night Time Coughing  not applicable -  B Precious Noted  No - Are you chewing thoroughly  - Does not take effect until 6 weeks post-op  No - Are you hungry after eating     How many meals a day 6 / Portions Sizes of Meals are 3 oz          5/9/22 Labs: WNL     Supplements: not taking yet    Estimated Daily Nutritional Needs: Based on Bariatric procedure for Wt. Loss  Energy: Will be calculated at Maintenance Stage    Protein: 60 - 80 gms Daily    MNT Plan and Additional Education: RD/LD reviewed Bariatric Puree Diet and how to puree food. Encouraged pt to meet protein and fluid needs daily. Pt. verbalized understanding. Handouts given. Pt. Instructed to call with questions. MEDICAL NUTRITION THERAPY (MNT) - Nutrition Care Plan   (The patient has been educated and given written education material that reinforces the following dietary guidelines for Bariatric Surgery)  Goal:  Patient able to verbalize the following dietary concepts:  3 to 4 ounce portions per meal     6 small meals daily      60 to 80 grams of protein   48 to 64 ounces of fluid daily (water)     Always consume protein item first with all meals   Pt is aware wt loss is pt controlled. Slow Meals - 30-35 chews per bite / Meals 30 - 45 minutes long            The RD / LD reviewed with the patient that the dietary goals of the bariatric patient is to ensure that patient is able to meet established nutritional needs for a Bariatric Surgery  Patient at this time in order to promote healing, prevent significant weight changes, prevent skin breakdown and abnormal lab values, prevent complications, and tolerance to diet and texture of foods. Pt is able to identify proper food choices and needed changes and is able to explain proper food preparation.   RD / LD reviewed compliance with assigned diet stages, volume of food and drink consumed, timing of meals, amount of protein and energy intake, daily vitamin and mineral supplementation, identify food cravings and any adverse reactions associated with intake of food and drink. RD / LD uses behavioral tools such as goal setting, MI, and self-monitoring, to reinforce the anatomic and physiologic effects of the surgery, so that the described behaviors become more of a habit not simply a reaction to the altered anatomy. Care Plan:   · Rd/Ld Addressed Food Records or 24-Hour Recall  · Rd / Ld encouraged patient to exercise at least 30 minutes daily  · Rd / Ld instructed patient on how to increase oral protein intake within the diet. Pt. can verbalize he / she will need to consume 60 - 80 grams. · Rd / Ld instructed the patient on how to increase the use of protein supplements within the diet. · Pt. was instructed on how to increase water intake. Patient will need to consume 48 - 64 oz. of just plain water in addition to 30 oz. of non-caloric beverages. · Handouts given to patient  · RD / LD encouraged oral intake    · RD / LD encouraged pt. to keep food records.       · Pt. is able to verbalize diet concepts      No - Constipation Handout Given and Reviewed    No - High Fiber Handout Given and Reviewed      No - Ulcer Handout Given and Reviewed          No - Pt. was instructed to continue current MNT   Yes - Pt. diet was advanced to the following stage ( See above)   No - Supplements: initiated (See list below  - Pt. given instruction)     No - Supplemental foods:______________________  No - Pt. was placed on a altered meal schedule    Good - Dietary Compliance

## 2022-05-18 ENCOUNTER — TELEPHONE (OUTPATIENT)
Dept: BARIATRICS/WEIGHT MGMT | Age: 52
End: 2022-05-18

## 2022-05-18 DIAGNOSIS — Z71.3 NUTRITIONAL COUNSELING: ICD-10-CM

## 2022-05-18 DIAGNOSIS — Z00.8 NUTRITIONAL ASSESSMENT: Primary | ICD-10-CM

## 2022-05-18 NOTE — TELEPHONE ENCOUNTER
5/23/22  Pt called in. Pt states she is doing well. Pt states her leg cramps have gone away. Pt states she is tolerating the water and her protein supplements. Pt states she is meeting fluid and protein needs daily. Pt states she has started the Bar Puree Diet and is having no problems. Pt states she is going to start her vitamins and supplements. Roger Fregoso reviewed the following schedule for vitamins and supplements. Pt can call with any questions or problems. Example Schedule: Take after eating    Breakfast None   Snack Bar. Advantage Iron - 1 tablet   Lunch Bar. Advantage Multi-Vitamin - 1 tablet   Bar Advantage Calcium - 1 tablet   Snack Bar. Advantage Calcium - 1 tablet   Dry Vitamin D3 - 5,000iu - 1 tablet    (-If [facility]G patient only - Vitamin B12   500 mcg 1 tablet daily)   Dinner [employer]. Advantage Multi-Vitamin - 1 tablet  Bar Advantage Calcium - 1 tablet   Snack None         5/18/22  Pt called in and stated last night she pureed some chicken and after doing that she has not felt well. Pt's complaint is of waking up with cramps in her feet and also feels like she has a lump in her throat ever since she had the pureed chicken last night. Pt states she feels like the food is stuck. Pt states she did make herself scrambled eggs this morning with cheese and has been able to tolerate these today. Roger Fregoso reviewed the following. Water Intake Recall:  12th - 64 oz  13th 68 oz  14th 74 oz  15th 6 oz  16th 60 oz  17th - 48 oz     POC:  1. Pt was instructed to increase fluid intake since she is probably dehydrated from not getting in enough fluids yesterday. 2. Bariatric Clear Liquid Diet  - Wed and Thursday  2. Bariatric Full Liquid Diet - Friday and Saturday  4. Bariatric Puree Diet  - Pt can resume on Sunday.    Roger Fregoso stressed it is her protein supplement 3 times a day 4oz in size that she needs to get in daily but her other 3 meals of Bariatric Puree foods she may be only able to tolerate 1 to 2 oz of for a meal. Roger Fregoso stressed portion control and slowing down eating habits. 5. Pt to call on Monday and update the [facility] on how she is tolerating her diet. 6. Pt to hold all supplements and vitamins except her protein supplements until Monday. 7. Pt to take her Omeprazole twice daily - Wed, Thursday and Friday d/t acid and lump in her throat from food being stuck and then resume once daily. 8. Pt to report to [facility] if s/s do not improve or if vomiting occurs or things get worse. Pt verbalized understanding. 1. Nutrition Diagnosis: Swallowing Difficulty    Etiologies:   Insufficient chewing of food   Eating too much food   Eating too rapidly   Altered GI - Bar Sx    Common Signs and Symptoms:    Feeling of \"food getting stuck\"   Pain While Swallowing   Lump in throat     Possible Interventions: Roger Fregoso Discussed    Meals / Snacks: modify composition of meals / snacks  o Modify type and amount of foods eaten at meals and snacks-attention to consumption of quantities and food texture per guidelines for diet stage. · Bariatric Clear Liquid Diet  - Wed and Thursday  · Bariatric Full Liquid Diet - Friday and Saturday  · Bariatric Puree Diet  - Pt can resume on Sunday. Roger Fregoso stressed it is her protein supplement 3 times a day 4oz in size that she needs to get in daily but her other 3 meals of Bariatric Puree foods she may be only able to tolerate 1 to 2 oz of for a meal. Roger Fregoso stressed portion control and slowing down eating habits. o Other: eat slowly; set utensil down between swallowed bites. Monitoring and evaluation out-come indicators:    Food intake:amount of food - selection of quantity (Pt states she will follow the slow gradual diet advancement and reduce volume of puree foods ) of foods at meals and snacks per recommendations.  Nutrition focused physical findings: throat and swallowing - reduction or elimination of reports of difficulty swallowing   Increase fluid intake    2.  Nutrition Diagnosis: Inadequate Fluid Intake    Etiologies:    Inability to consume fluids - Altered GI (nausea, vomiting, or other gastrointestinal  upset / discomfort)   Food and nutrition related knowledge deficit    Common Signs and Symptoms:    Feet Cramping   Reports of:   Consuming less than 48 oz of total fluid daily   Thirst   Difficulty swallowing   Diarrhea, nausea, vomiting or GI upset      Possible Interventions: Roger Fregoso Discussed   Meals / snacks: Increased Fluid Diet   Food or nutrient delivery: modify schedule of foods / fluid - Pt states he / she -  Pt states she is going to work on pushing more fluids today once she feels better. Monitoring and evaluation out-come indicators:   Fluid Intake:  Total fluid estimated intake in 24 hours - amount consumed follows diet stage guidelines   Adherence: nutrition self-monitoring at agreed-upon rate-monitoring fluid intake per hour   Urine profile: urine color - light yellow or clear   Skin: Decreased skin turgor - improved or resolved skin turgor

## 2022-05-18 NOTE — TELEPHONE ENCOUNTER
Patient stated that she started her puree diet. Stated that she ate puree chicken and then felt extremely full. She stated that afterwards she could not intake water and had a jolt of pain in her abdomen. She also stated that she was having leg cramps, but she believes this is because she was lacking water. When she laid down she felt like something was stuck in her throat.

## 2022-06-01 ENCOUNTER — TELEPHONE (OUTPATIENT)
Dept: BARIATRICS/WEIGHT MGMT | Age: 52
End: 2022-06-01

## 2022-06-01 NOTE — TELEPHONE ENCOUNTER
Dr Justine Silva ok'd patient to return to work on 7/1/22.  I faxed letter of rtw to Jatin Navarrete at [phone] pr pt request.

## 2022-06-02 ENCOUNTER — TELEPHONE (OUTPATIENT)
Dept: BARIATRICS/WEIGHT MGMT | Age: 52
End: 2022-06-02

## 2022-06-02 DIAGNOSIS — K21.9 GASTROESOPHAGEAL REFLUX DISEASE WITHOUT ESOPHAGITIS: ICD-10-CM

## 2022-06-02 RX ORDER — OMEPRAZOLE 20 MG/1
20 CAPSULE, DELAYED RELEASE ORAL DAILY
Qty: 30 CAPSULE | Refills: 12 | Status: SHIPPED | OUTPATIENT
Start: 2022-06-02 | End: 2023-06-02

## 2022-06-02 NOTE — TELEPHONE ENCOUNTER
Pt called into the office and stated that she's out of town and left her prescription of Omeprazole at home. She requested a prescription be sent into [facility] at [address] [place] [address] -- [address]: [phone]. RX has been faxed.

## 2022-06-08 ENCOUNTER — TELEPHONE (OUTPATIENT)
Dept: BARIATRICS/WEIGHT MGMT | Age: 52
End: 2022-06-08

## 2022-06-08 NOTE — TELEPHONE ENCOUNTER
Pt called in. Pt is traveling and is in [place] less than 6 weeks post-op. Pt states she feels like food is getting stuck and has a heaviness to it. Pt is pureeing all her meals. Pt left her Prilosec at home but did purchase OTC and states I don't think it is working the same. Roger Fregoso asked pt about medications she took before Wt Loss Sx. Pt used to take Flonase and Allegra-D but stopped after surgery d/t problems she was having with swallowing. Pt was instructed to purchase them today and start taking again. Pt can take her Prilosec twice a day until Friday when she returns home from [place]. If pt does not have any relieve or problems get worse pt was instructed to report to ER. Pts pouch is small and can be filling with po nasal drainage causing the feeling of fullness, nausea and not being able to consume meals after WLS. Pt verbalized understanding.

## 2022-06-22 ENCOUNTER — OFFICE VISIT (OUTPATIENT)
Dept: BARIATRICS/WEIGHT MGMT | Age: 52
End: 2022-06-22
Payer: COMMERCIAL

## 2022-06-22 ENCOUNTER — INITIAL CONSULT (OUTPATIENT)
Dept: BARIATRICS/WEIGHT MGMT | Age: 52
End: 2022-06-22

## 2022-06-22 VITALS
HEART RATE: 78 BPM | DIASTOLIC BLOOD PRESSURE: 101 MMHG | SYSTOLIC BLOOD PRESSURE: 158 MMHG | TEMPERATURE: 97.3 F | HEIGHT: 67 IN | BODY MASS INDEX: 35 KG/M2 | RESPIRATION RATE: 20 BRPM | WEIGHT: 223 LBS

## 2022-06-22 VITALS — WEIGHT: 223 LBS | HEIGHT: 67 IN | BODY MASS INDEX: 35 KG/M2

## 2022-06-22 DIAGNOSIS — Z71.3 NUTRITIONAL COUNSELING: ICD-10-CM

## 2022-06-22 DIAGNOSIS — Z00.8 NUTRITIONAL ASSESSMENT: Primary | ICD-10-CM

## 2022-06-22 DIAGNOSIS — K91.2 MALNUTRITION FOLLOWING GASTROINTESTINAL SURGERY: Primary | ICD-10-CM

## 2022-06-22 PROCEDURE — 1036F TOBACCO NON-USER: CPT | Performed by: SURGERY

## 2022-06-22 PROCEDURE — 3017F COLORECTAL CA SCREEN DOC REV: CPT | Performed by: SURGERY

## 2022-06-22 PROCEDURE — G8427 DOCREV CUR MEDS BY ELIG CLIN: HCPCS | Performed by: SURGERY

## 2022-06-22 PROCEDURE — 99999 PR OFFICE/OUTPT VISIT,PROCEDURE ONLY: CPT | Performed by: SURGERY

## 2022-06-22 PROCEDURE — G8417 CALC BMI ABV UP PARAM F/U: HCPCS | Performed by: SURGERY

## 2022-06-22 PROCEDURE — 99212 OFFICE O/P EST SF 10 MIN: CPT

## 2022-06-22 PROCEDURE — 99213 OFFICE O/P EST LOW 20 MIN: CPT | Performed by: SURGERY

## 2022-06-22 RX ORDER — PANTOPRAZOLE SODIUM 20 MG/1
20 TABLET, DELAYED RELEASE ORAL DAILY
Qty: 30 TABLET | Refills: 3 | Status: SHIPPED
Start: 2022-06-22 | End: 2022-10-24 | Stop reason: SDUPTHER

## 2022-06-22 NOTE — PROGRESS NOTES
Medical Nutrition Therapy (MNT) Assessment   Pt is aware this phone call conversation will be documented and is in agreement to the documentation: Pt verbalized - Yes    Pt. Name: Dulce Owens   Date:6/22/2022  F/U Appt: Sx Type - 6 week RYGB F/U Appointment     Ht 5' 7\" (1.702 m)   Wt 223 lb (101.2 kg)   BMI 34.93 kg/m²  Height: 5' 7\" (1.702 m) Weight: 223 lb (101.2 kg)   IBW:ideal body weight 163 lbs % EBWL: 34%     Wt Readings from Last 3 Encounters:   06/22/22 223 lb (101.2 kg)   06/22/22 223 lb (101.2 kg)   05/11/22 249 lb (112.9 kg)                     Protein supplements: Pt. is currently using the following protein supplement Nectar and consuming the following grams of protein 101 grams. Rd / Ld reviewed with patient based on 1.0 gram per kg of IBW patient needs 74 - 84 grams of protein total daily. Subjective:                   Current MNT:       Bariatric puree diet with MVI, Calcium & Protein Supplements     MNT Advanced to:     Bariatric soft diet with MVI, Calcium & Protein Supplements      Allergies and Food Allergies and Food Intolerances: Pt states she is not tolerating salmon since having wt loss sx. Labs: No lab results    Supplements: Bariatric Advantage Multi-Vitamin 1 tablet 2 times Daily, Bariatric Advantage 29 mgs Iron 1 tablet Daily, Bariatric Advanatge Calcium Lozenges 1 tablet 3 times Daily , Dry Vitamin D3 5,000iu every day    Estimated Daily Nutritional Needs: Based on Bariatric procedure for Wt. Loss  Energy: Will be calculated at Maintenance Stage    Protein: Average 60 - 80 gms Daily - See individual needs listed above based on IBW    MNT Plan and Additional Education:  RD/LD reviewed Bariatric Soft Diet. Encouraged pt to meet protein and fluid needs daily. Pt. verbalized understanding. Handouts given. Pt. instructed to call with questions. Roger Fregoso did not complete a N/A on the pt d/t.  Roger Fregoso spent over an hour with the pt on diet instruction and answering pts questions. Pt verbalized understanding. Pt instructed to call with questions, problems or concerns. Yes 1. Pt is consuming his / her recommended amount of protein within the diet based on patients Ideal Body Weight. .    Yes 2. Pt is using the recommended Bariatric approved protein supplement and taking in the correct amount of protein daily. Pt is able to verbalize how to mix his / her protein supplement correctly to meet pts needs. Pt verbalized understanding. Yes 3. Pt is using the recommended Bariatric approved Multi-Vitamin, Bariatric approved Calcium, Bariatric approved Iron supplement or Bariatric approved Vitamin D and taking the correct dose. Pt was educated per his / her Bariatric Surgeons protocol he / she needs the following daily -  Bariatric Advantage Multi-Vitamin 1 tablet 2 times Daily, Bariatric Advantage 29 mgs Iron 1 tablet Daily, Bariatric Advantage Calcium Lozenges 1 tablet 3 times Daily , Dry Vitamin D3 5,000iu every day. Pt verbalized understanding. Yes 4. Pt kept daily food records. Pt is aware food records need to be kept life-long daily and brought to all follow-up appointments in order to show compliancy after weight loss surgery. Pt verbalized understanding. N/A 5. Pt is taking the correct stool softener for the first 6 weeks with the correct dose. Pt is also taking the correct fiber supplement with the correct dose starting at his / her 2 week f/u appointment. Pt verbalized understanding. See above instruction and AVS.     Yes 6. Pt is drinking 64 - 90 oz of plain water daily. Patient was instructed on the importance of increasing water intake to 48 - 64 oz. of water total daily. Pt. was also instructed he / she is allowed an additional 30 oz. of sugar free caffeine free clear liquid beverages for a total of 90 oz. of fluid total daily. Pt. was able to verbalize how he / she can get more water and fluids within the diet.   Per bariatric surgeons protocol after weight loss surgery. Pt. verbalized understanding. Yes 7. Pt achieved his / her percentage of excess body weight loss at his / her f/u appointments and is on track to reach his / her weight loss goal.    Yes 8. Pt is weighing and measuring all foods using a food scale and measuring cups. Pt reports portion sizes are 3 to 4 oz in size. Portion sizes are not exceeding 6 ounces total per meal lifelong. Pt verbalized understanding. .     Yes 9. Pt is not experiencing Dumping Syndrome from food / beverage consumption. Yes 10. Pt is complying with all stages and consistencies of the bariatric diet and is not eating or drinking foods / beverages that is not listed on the diet at this stage. Yes (Staff FYI) - Pt is not drinking carbonated beverages, alcoholic beverages or juices at this time. Compliancy Scale  - After Weight Loss Surgery :  9 Good - Dietary Compliance - This is based on patient following the Medical Nutrition Therapy protocol after Weight Loss Surgery  7 to 10 Points - Good (70% - to 100%) -  Pt is following what he / she has been instructed on at the time of this visit. 4 to 7 Points - Fair (40% to 70%) - Pt has areas that he / she needs to work on in order to be compliant with the bariatric protocol after weight loss surgery. 0 to 4 Points - Poor (0% - 40%) - Pt is failing to follow the instructions given to the pt. Roger Fregoso has concerns pt may be creating harm. Pt was offered additional dietary counseling appointments to help pt make the necessary lifestyle changes to show compliancy after weight loss surgery.      MEDICAL NUTRITION THERAPY (MNT) - Nutrition Care Plan   (The patient has been educated and given written education material that reinforces the following dietary guidelines for Bariatric Surgery)  Goal:  Patient able to verbalize the following dietary concepts:  3 to 4 ounce portions per meal     6 small meals daily      60 to 80 grams of protein on average see individual protein needs   48 to 64 ounces of fluid daily (water)     Always consume protein item first with all meals   Pt is aware wt loss is pt controlled. Slow Meals - 30-35 chews per bite / Meals 30 - 45 minutes long            The RD / LD reviewed with the patient that the dietary goals of the bariatric patient is to ensure that patient is able to meet established nutritional needs for a Bariatric Surgery Patient at this time in order to promote healing, prevent significant weight changes, prevent skin breakdown and abnormal lab values, prevent complications, and tolerance to diet and texture of foods. Pt is able to identify proper food choices and needed changes and is able to explain proper food preparation. RD / LD reviewed compliance with assigned diet stages, volume of food and drink consumed, timing of meals, amount of protein and energy intake, daily vitamin and mineral supplementation, identify food cravings and any adverse reactions associated with intake of food and drink. RD / LD uses behavioral tools such as goal setting, MI, and self-monitoring, to reinforce the anatomic and physiologic effects of the surgery, so that the described behaviors become more of a habit not simply a reaction to the altered anatomy. Care Plan:   Yes - Rd/Ld Addressed Food Records or 24-Hour Recall  Yes - Rd / Ld encouraged patient to exercise at least 30 minutes daily  Yes - Rd / Ld instructed patient on how to increase oral protein intake within the diet. Pt. can verbalize his / her individual protein needs at this visit. Yes - Rd / Ld instructed the patient on how to increase the use of protein supplements within the diet if appropriate at this time. Yes - Pt. was instructed on how to increase water intake. Patient will need to consume 48 - 64 oz. of just plain water in addition to 30 oz. of non-caloric beverages.   Yes - Handouts given to patient - Also see After Visit Summary in addition to paper copy diet instruction. Yes - RD / LD encouraged oral intake    Yes -  RD / LD encouraged pt. to keep food records daily.       Yes - Pt. is able to verbalize diet concepts      Yes - Constipation Handout Given and Reviewed - Part of surgeons Bowel Protocol - See After Visit Summary    Yes - High Fiber Handout Given and Reviewed - Part of surgeons Bowel Protocol - See After Visit Summary        No - Ulcer Handout Given and Reviewed          No - Pt. was instructed to continue current MNT   Yes - Pt. diet was advanced to the following stage ( See above)   No - Supplements: initiated (See list below  - Pt. given instruction)     No - Supplemental foods:______________________  No - Pt. was placed on a altered meal schedule

## 2022-06-22 NOTE — PATIENT INSTRUCTIONS
Please continue to take your vitamin and mineral supplements as instructed. If you received a blood work prescription today for laboratory monitoring due prior to your next routine follow-up visit, please have this blood work obtained 10 to 14 days prior to your next visit. It is important to fast for 12 hours prior to routine weight loss surgery blood work, EXCEPT for drinking water, to ensure accuracy of results. Please report nausea, vomiting, abdominal pain, or any other problems you experience to your surgeon. For problems related to weight loss surgery, it is best to go to [address] [place] [address] Emergency Department and have your surgeon paged.

## 2022-06-22 NOTE — PROGRESS NOTES
Dianna Benito [facility]  6/22/2022  Laparoscopic Christ-en- Y Gastric Bypass   6 weeks Post-Operative Follow-up. Subjective:   Erin Frederick is a 46 y.o. female  six weeks post Laparoscopic Christ-en-Y Gastric Bypass. She reports epigastric pain that resolved with double dose omeprazole. The patient is not having any pain. Taking the pureed diet well, no nausea, no pain, wounds all healed. Sheis not having constipation problems. She is not having swallowing difficulty, is compliant most of the time with the multivitamins and calcium + Vit D. She is meeting fluid recommendations of at least 64 ounces per day and is meeting protein recommendations. Exercise: cardiovascular equipment 30 minutes/day- 4 days/week. Weight: 223 lb (101.2 kg)  Today's weight represents a weight loss of 32 pounds. Prior to Admission medications    Medication Sig Start Date End Date Taking?  Authorizing Provider   Ferrous Sulfate (IRON PO) Take by mouth   Yes Historical Provider, MD   Calcium Citrate-Vitamin D (CALCIUM + VIT D, BARIATRIC ADVANTAGE, CHEWABLE TABLET) Take 1 tablet by mouth daily   Yes Historical Provider, MD   Multiple Vitamin (MVI, BARIATRIC ADVANTAGE MULTI-FORMULA, CHEW TAB) Take 1 tablet by mouth daily   Yes Historical Provider, MD   omeprazole (PRILOSEC) 20 MG delayed release capsule Take 1 capsule by mouth Daily 6/2/22 6/2/23 Yes Yelitza Rothman MD   ondansetron (ZOFRAN-ODT) 4 MG disintegrating tablet Take 1 tablet by mouth every 8 hours as needed for Nausea or Vomiting 4/27/22  Yes Yelitza Rothman MD   Fexofenadine-Pseudoephedrine (ALLEGRA-D PO) Take by mouth daily as needed    Yes Historical Provider, MD   fluticasone (FLONASE) 50 MCG/ACT nasal spray 1 spray by Each Nostril route daily   Yes Historical Provider, MD      Physical exam:  VITALS:   BP (!) 158/101 (Site: Right Lower Arm, Position: Sitting, Cuff Size: Medium Adult)   Pulse 78   Temp 97.3 °F (36.3 °C) (Temporal)   Resp 20   Ht 5' 7\" (1.702 m)   Wt 223 lb (101.2 kg)   BMI 34.93 kg/m²    General appearance: alert, appears stated age and cooperative  Head: Normocephalic, without obvious abnormality, atraumatic  Neck: no adenopathy, no carotid bruit, no JVD, supple, symmetrical, trachea midline and thyroid not enlarged, symmetric, no tenderness/mass/nodules  Lungs: clear to auscultation bilaterally  Heart: regular rate and rhythm, S1, S2 normal, no murmur, click, rub or gallop  Abdomen: soft, non-tender; bowel sounds normal; no masses,  no organomegaly  Extremities: extremities normal, atraumatic, no cyanosis or edema    Assessment: epigastric pain  6 weeks post Laparoscopic Christ-en- Y Gastric Bypass. Plan: will switch to protonix  Transition to the soft high protein diet. Eat small portions very slowly and chew until the food is liquified before swallowing. Track protein and fluids and bring to the next appointment, maintain adequate variety and balance. Follow up in 6 weeks and contact me if questions arise in the meantime. Exercise 7 days a week at least 30 minutes. Repeat labs before the next appointment. Make sure the bowel move daily by taking fiber.        Physician Signature: Electronically signed by Dr. Candy Cazares MD

## 2022-06-22 NOTE — PROGRESS NOTES
Patient is 6 wk LRYGB. Water intake is around 48 oz daily. Protein through shakes and foods. Vitamin intake is good. Bowel movements are good. Scheduled with Adriana Good for dietary.

## 2022-07-26 ENCOUNTER — TELEPHONE (OUTPATIENT)
Dept: BARIATRICS/WEIGHT MGMT | Age: 52
End: 2022-07-26

## 2022-08-02 ENCOUNTER — HOSPITAL ENCOUNTER (OUTPATIENT)
Age: 52
Discharge: HOME | End: 2022-08-02
Payer: COMMERCIAL

## 2022-08-02 DIAGNOSIS — K91.2 MALNUTRITION FOLLOWING GASTROINTESTINAL SURGERY: ICD-10-CM

## 2022-08-02 LAB
ALBUMIN SERPL-MCNC: 4.3 G/DL (ref 3.5–5.2)
ALP BLD-CCNC: 119 U/L (ref 35–104)
ALT SERPL-CCNC: 18 U/L (ref 0–32)
ANION GAP SERPL CALCULATED.3IONS-SCNC: 10 MMOL/L (ref 7–16)
AST SERPL-CCNC: 22 U/L (ref 0–31)
BILIRUB SERPL-MCNC: 0.4 MG/DL (ref 0–1.2)
BUN BLDV-MCNC: 18 MG/DL (ref 6–20)
CALCIUM SERPL-MCNC: 10.2 MG/DL (ref 8.6–10.2)
CHLORIDE BLD-SCNC: 105 MMOL/L (ref 98–107)
CHOLESTEROL, TOTAL: 135 MG/DL (ref 0–199)
CO2: 27 MMOL/L (ref 22–29)
CREAT SERPL-MCNC: 0.8 MG/DL (ref 0.5–1)
FOLATE: 5.9 NG/ML (ref 4.8–24.2)
GFR AFRICAN AMERICAN: >60
GFR NON-AFRICAN AMERICAN: >60 ML/MIN/1.73
GLUCOSE BLD-MCNC: 101 MG/DL (ref 74–99)
HCT VFR BLD CALC: 39.6 % (ref 34–48)
HEMOGLOBIN: 12.7 G/DL (ref 11.5–15.5)
MCH RBC QN AUTO: 29.1 PG (ref 26–35)
MCHC RBC AUTO-ENTMCNC: 32.1 % (ref 32–34.5)
MCV RBC AUTO: 90.8 FL (ref 80–99.9)
PDW BLD-RTO: 13.7 FL (ref 11.5–15)
PLATELET # BLD: 287 E9/L (ref 130–450)
PMV BLD AUTO: 10.4 FL (ref 7–12)
POTASSIUM SERPL-SCNC: 3.9 MMOL/L (ref 3.5–5)
PREALBUMIN: 19 MG/DL (ref 20–40)
RBC # BLD: 4.36 E12/L (ref 3.5–5.5)
SODIUM BLD-SCNC: 142 MMOL/L (ref 132–146)
TOTAL PROTEIN: 7.8 G/DL (ref 6.4–8.3)
TRIGL SERPL-MCNC: 92 MG/DL (ref 0–149)
VITAMIN B-12: 570 PG/ML (ref 211–946)
WBC # BLD: 4.3 E9/L (ref 4.5–11.5)

## 2022-08-02 PROCEDURE — 84630 ASSAY OF ZINC: CPT

## 2022-08-02 PROCEDURE — 84478 ASSAY OF TRIGLYCERIDES: CPT

## 2022-08-02 PROCEDURE — 82465 ASSAY BLD/SERUM CHOLESTEROL: CPT

## 2022-08-02 PROCEDURE — 80053 COMPREHEN METABOLIC PANEL: CPT

## 2022-08-02 PROCEDURE — 82746 ASSAY OF FOLIC ACID SERUM: CPT

## 2022-08-02 PROCEDURE — 82728 ASSAY OF FERRITIN: CPT

## 2022-08-02 PROCEDURE — 82607 VITAMIN B-12: CPT

## 2022-08-02 PROCEDURE — 36415 COLL VENOUS BLD VENIPUNCTURE: CPT

## 2022-08-02 PROCEDURE — 84134 ASSAY OF PREALBUMIN: CPT

## 2022-08-02 PROCEDURE — 85027 COMPLETE CBC AUTOMATED: CPT

## 2022-08-02 PROCEDURE — 84425 ASSAY OF VITAMIN B-1: CPT

## 2022-08-03 ENCOUNTER — OFFICE VISIT (OUTPATIENT)
Dept: BARIATRICS/WEIGHT MGMT | Age: 52
End: 2022-08-03

## 2022-08-03 ENCOUNTER — OFFICE VISIT (OUTPATIENT)
Dept: BARIATRICS/WEIGHT MGMT | Age: 52
End: 2022-08-03
Payer: COMMERCIAL

## 2022-08-03 VITALS
SYSTOLIC BLOOD PRESSURE: 155 MMHG | WEIGHT: 206 LBS | HEART RATE: 75 BPM | HEIGHT: 67 IN | DIASTOLIC BLOOD PRESSURE: 107 MMHG | RESPIRATION RATE: 20 BRPM | BODY MASS INDEX: 32.33 KG/M2 | TEMPERATURE: 97.2 F

## 2022-08-03 VITALS — HEIGHT: 67 IN | BODY MASS INDEX: 32.33 KG/M2 | WEIGHT: 206 LBS

## 2022-08-03 DIAGNOSIS — E88.09 HYPOALBUMINEMIA: Primary | ICD-10-CM

## 2022-08-03 DIAGNOSIS — Z71.3 DIETARY COUNSELING: Primary | ICD-10-CM

## 2022-08-03 LAB — FERRITIN: 90 NG/ML

## 2022-08-03 PROCEDURE — 1036F TOBACCO NON-USER: CPT | Performed by: SURGERY

## 2022-08-03 PROCEDURE — 3017F COLORECTAL CA SCREEN DOC REV: CPT | Performed by: SURGERY

## 2022-08-03 PROCEDURE — G8417 CALC BMI ABV UP PARAM F/U: HCPCS | Performed by: SURGERY

## 2022-08-03 PROCEDURE — 99212 OFFICE O/P EST SF 10 MIN: CPT

## 2022-08-03 PROCEDURE — 99999 PR OFFICE/OUTPT VISIT,PROCEDURE ONLY: CPT

## 2022-08-03 PROCEDURE — 99212 OFFICE O/P EST SF 10 MIN: CPT | Performed by: SURGERY

## 2022-08-03 PROCEDURE — G8427 DOCREV CUR MEDS BY ELIG CLIN: HCPCS | Performed by: SURGERY

## 2022-08-03 NOTE — PATIENT INSTRUCTIONS
Please continue to take your vitamin and mineral supplements as instructed. If you received a blood work prescription today for laboratory monitoring due prior to your next routine follow-up visit, please have this blood work obtained 10 to 14 days prior to your next visit. It is important to fast for 12 hours prior to routine weight loss surgery blood work, EXCEPT for drinking water, to ensure accuracy of results. Please report nausea, vomiting, abdominal pain, or any other problems you experience to your surgeon. For problems related to weight loss surgery, it is best to go to [address] [place] [address] Emergency Department and have your surgeon paged.

## 2022-08-03 NOTE — PROGRESS NOTES
Patient is 3 mo LRYGB. Water intake is around 64-70 oz daily. Protein through shakes and foods. Vitamin intake is good. Bowel movements are good. Scheduled with Anneliese Mckeon for dietary. Labs in [facility].

## 2022-08-03 NOTE — PROGRESS NOTES
Medical Nutrition Therapy (MNT) Assessment     Pt. Name: Loren Owens   Date:8/3/2022  F/U Appt: Sx Type 3 mo follow up RYGB       Ht 5' 7\" (1.702 m)   Wt 206 lb (93.4 kg)   BMI 32.26 kg/m²  Height: 5' 7\" (1.702 m) Weight: 206 lb (93.4 kg)   IBW:ideal body weight   163 lbs % EBWL: 53%     Wt Readings from Last 3 Encounters:   08/03/22 206 lb (93.4 kg)   08/03/22 206 lb (93.4 kg)   06/22/22 223 lb (101.2 kg)                     Protein supplements: Pt. is currently using the following protein supplement Nectar and consuming the following grams of protein [address] plus. Rd / Ld reviewed with patient based on 1.0 gram per kg of IBW patient needs 74-84 grams of protein total daily. Subjective:                   Current MNT:     Bariatric soft diet with MVI, Calcium & Protein Supplement       MNT Advanced to:   Bariatric soft diet introducing raw fruit, raw vegetables, rice, protein bars, multivitamin, calcium, protein supplements      Allergies and Food Allergies and Food Intolerances:  salmon not zohaib     [address] Bowel Protocol:  no - Diarrhea  No - Constipation  How much plain water are you drinking daily 64-84 oz   No - Are you taking Colace daily  What amount of Colace are you taking daily none  Yes - Are you taking Sugar Free Chewable Fiber Gummies  What amount of Sugar Free Chewable Fiber Gummies are you taking daily 5       No - Any pain or problems you are currently experiencing? How many meals a day 5-6 / Portions Sizes of Meals are 3-4 oz          8/2/22 Labs: glucose 101H, alk phos 119H, WBC 4.3L, prealbumin 19L    Supplements: Bariatric Advantage Multi-Vitamin 1 tablet 2 times Daily, Bariatric Advantage 29 mgs Iron 1 tablet Daily, Bariatric Advanatge Calcium Lozenges 1 tablet 3 times Daily , Dry Vitamin D3 5,000iu every day    Estimated Daily Nutritional Needs: Based on Bariatric procedure for Wt.  Loss  Energy: Will be calculated at Maintenance Stage    Protein: 60 - 80 gms Daily    MNT Plan and Additional Education: RD/LD reviewed Bariatric Soft Diet incorporating in Raw Fruits, Raw Vegetables, Red Meat, and Rice. Encouraged pt to meet protein and fluid needs daily. Handouts given. Pt. verbalized understanding. Pt instructed to call with questions. MEDICAL NUTRITION THERAPY (MNT) - Nutrition Care Plan   (The patient has been educated and given written education material that reinforces the following dietary guidelines for Bariatric Surgery)  Goal:  Patient able to verbalize the following dietary concepts:  3 to 4 ounce portions per meal     6 small meals daily      60 to 80 grams of protein   48 to 64 ounces of fluid daily (water)     Always consume protein item first with all meals   Pt is aware wt loss is pt controlled. Slow Meals - 30-35 chews per bite / Meals 30 - 45 minutes long            The RD / LD reviewed with the patient that the dietary goals of the bariatric patient is to ensure that patient is able to meet established nutritional needs for a Bariatric Surgery  Patient at this time in order to promote healing, prevent significant weight changes, prevent skin breakdown and abnormal lab values, prevent complications, and tolerance to diet and texture of foods. Pt is able to identify proper food choices and needed changes and is able to explain proper food preparation. RD / LD reviewed compliance with assigned diet stages, volume of food and drink consumed, timing of meals, amount of protein and energy intake, daily vitamin and mineral supplementation, identify food cravings and any adverse reactions associated with intake of food and drink. RD / LD uses behavioral tools such as goal setting, MI, and self-monitoring, to reinforce the anatomic and physiologic effects of the surgery, so that the described behaviors become more of a habit not simply a reaction to the altered anatomy.      Care Plan:   Rd/Ld Addressed Food Records or 24-Hour Recall  Rd / Ld encouraged patient to exercise at least 30 minutes daily  Rd / Ld instructed patient on how to increase oral protein intake within the diet. Pt. can verbalize he / she will need to consume 60 - 80 grams. Rd / Ld instructed the patient on how to increase the use of protein supplements within the diet. Pt. was instructed on how to increase water intake. Patient will need to consume 48 - 64 oz. of just plain water in addition to 30 oz. of non-caloric beverages. Handouts given to patient  RD / LD encouraged oral intake    RD / LD encouraged pt. to keep food records.       Pt. is able to verbalize diet concepts         Yes - Pt. diet was advanced to the following stage ( See above)     Good - Dietary Compliance

## 2022-08-03 NOTE — PROGRESS NOTES
Beth Arriola Roman  8/3/2022  Laparoscopic Christ-en- Y Gastric Bypass   3 months Post-Operative Follow-up. Subjective:   Vivienne Payne is a 46 y.o. female three months post Laparoscopic Christ-en-Y Gastric Bypass. She reports no issues. Reports no problems with eating, bowel movements, voiding, or the wounds. She is not having swallowing difficulty, is compliant most of the time with the multivitamins and calcium + Vit D. She is meeting fluid recommendations of at least 64 ounces per day and is meeting protein recommendations. Exercise: cardiovascular equipment 30 minutes/day- 4 days/week. Weight=206 lb (93.4 kg)  Today's weight represents a total weight loss to date of 49 pounds. Prior to Admission medications    Medication Sig Start Date End Date Taking?  Authorizing Provider   Ferrous Sulfate (IRON PO) Take by mouth   Yes Historical Provider, MD   Calcium Citrate-Vitamin D (CALCIUM + VIT D, BARIATRIC ADVANTAGE, CHEWABLE TABLET) Take 1 tablet by mouth daily   Yes Historical Provider, MD   Multiple Vitamin (MVI, BARIATRIC ADVANTAGE MULTI-FORMULA, CHEW TAB) Take 1 tablet by mouth daily   Yes Historical Provider, MD   pantoprazole (PROTONIX) 20 MG tablet Take 1 tablet by mouth daily 6/22/22  Yes Budd Councilman, MD   omeprazole (PRILOSEC) 20 MG delayed release capsule Take 1 capsule by mouth Daily 6/2/22 6/2/23 Yes Budd Councilman, MD   ondansetron (ZOFRAN-ODT) 4 MG disintegrating tablet Take 1 tablet by mouth every 8 hours as needed for Nausea or Vomiting 4/27/22  Yes Budd Councilman, MD   Fexofenadine-Pseudoephedrine (ALLEGRA-D PO) Take by mouth daily as needed    Yes Historical Provider, MD   fluticasone (FLONASE) 50 MCG/ACT nasal spray 1 spray by Each Nostril route daily   Yes Historical Provider, MD          Physical exam:   BP (!) 155/107 (Site: Right Lower Arm, Position: Sitting, Cuff Size: Medium Adult)   Pulse 75   Temp 97.2 °F (36.2 °C) (Temporal)   Resp 20   Ht 5' 7\" (1.702 m) Wt 206 lb (93.4 kg)   BMI 32.26 kg/m²   General appearance: alert, appears stated age, and cooperative  Head: Normocephalic, without obvious abnormality, atraumatic  Neck: no adenopathy, no carotid bruit, no JVD, supple, symmetrical, trachea midline, and thyroid not enlarged, symmetric, no tenderness/mass/nodules  Lungs: clear to auscultation bilaterally  Heart: regular rate and rhythm, S1, S2 normal, no murmur, click, rub or gallop  Abdomen: soft, non-tender; bowel sounds normal; no masses,  no organomegaly  Extremities: extremities normal, atraumatic, no cyanosis or edema    Assessment: doing well 3 months post Laparoscopic Christ-en- Y Gastric Bypass. Plan:  Keep up good work. Continue eating a soft, high protein, low calorie diet. Eat small portions very slowly and chew well before swallowing. Drink plenty of water,  maintain adequate variety and balance. Try to exercise more, at least 30 minutes per day, 7 days per week. Follow up in 3 months and contact me if questions arise in the meantime. Start using fiber therapy such as Metamucil twice a day to prevent constipation. Bowels must move every day.       Physician Signature: Electronically signed by Dr. Jacques Fay MD

## 2022-08-05 LAB — ZINC: 117 UG/DL (ref 60–120)

## 2022-08-07 LAB — VITAMIN B1 WHOLE BLOOD: 121 NMOL/L (ref 70–180)

## 2022-09-27 ENCOUNTER — TELEPHONE (OUTPATIENT)
Dept: BARIATRICS/WEIGHT MGMT | Age: 52
End: 2022-09-27

## 2022-09-27 DIAGNOSIS — Z00.8 NUTRITIONAL ASSESSMENT: Primary | ICD-10-CM

## 2022-09-27 DIAGNOSIS — Z71.3 NUTRITIONAL COUNSELING: ICD-10-CM

## 2022-09-27 NOTE — TELEPHONE ENCOUNTER
Pt was having some lower back pain. Pt states she was not drinking enough water. Pt states she increased her water intake and it has gone away. Pt was instructed if it resumes to f/u with PCP. Roger Fregoso reviewed PCP can r/o kidney stones since it was her back then area over her hip causing  problems after her back pain. Enc pt to make sure she is drinking her fluids daily. Pt verbalized understanding.

## 2022-10-24 RX ORDER — PANTOPRAZOLE SODIUM 20 MG/1
20 TABLET, DELAYED RELEASE ORAL DAILY
Qty: 30 TABLET | Refills: 3 | Status: SHIPPED | OUTPATIENT
Start: 2022-10-24

## 2022-11-07 ENCOUNTER — INITIAL CONSULT (OUTPATIENT)
Dept: BARIATRICS/WEIGHT MGMT | Age: 52
End: 2022-11-07

## 2022-11-07 ENCOUNTER — OFFICE VISIT (OUTPATIENT)
Dept: BARIATRICS/WEIGHT MGMT | Age: 52
End: 2022-11-07
Payer: COMMERCIAL

## 2022-11-07 VITALS
WEIGHT: 176 LBS | HEIGHT: 67 IN | SYSTOLIC BLOOD PRESSURE: 147 MMHG | RESPIRATION RATE: 20 BRPM | BODY MASS INDEX: 27.62 KG/M2 | TEMPERATURE: 97.7 F | DIASTOLIC BLOOD PRESSURE: 80 MMHG | HEART RATE: 79 BPM

## 2022-11-07 VITALS — BODY MASS INDEX: 27.62 KG/M2 | WEIGHT: 176 LBS | HEIGHT: 67 IN

## 2022-11-07 DIAGNOSIS — K21.9 GASTROESOPHAGEAL REFLUX DISEASE WITHOUT ESOPHAGITIS: ICD-10-CM

## 2022-11-07 DIAGNOSIS — K91.2 MALNUTRITION FOLLOWING GASTROINTESTINAL SURGERY: Primary | ICD-10-CM

## 2022-11-07 DIAGNOSIS — Z71.3 DIETARY COUNSELING: Primary | ICD-10-CM

## 2022-11-07 PROCEDURE — 3017F COLORECTAL CA SCREEN DOC REV: CPT | Performed by: NURSE PRACTITIONER

## 2022-11-07 PROCEDURE — G8484 FLU IMMUNIZE NO ADMIN: HCPCS | Performed by: NURSE PRACTITIONER

## 2022-11-07 PROCEDURE — G8417 CALC BMI ABV UP PARAM F/U: HCPCS | Performed by: NURSE PRACTITIONER

## 2022-11-07 PROCEDURE — 99213 OFFICE O/P EST LOW 20 MIN: CPT | Performed by: NURSE PRACTITIONER

## 2022-11-07 PROCEDURE — 99212 OFFICE O/P EST SF 10 MIN: CPT

## 2022-11-07 PROCEDURE — 99999 PR OFFICE/OUTPT VISIT,PROCEDURE ONLY: CPT

## 2022-11-07 PROCEDURE — G8427 DOCREV CUR MEDS BY ELIG CLIN: HCPCS | Performed by: NURSE PRACTITIONER

## 2022-11-07 PROCEDURE — 1036F TOBACCO NON-USER: CPT | Performed by: NURSE PRACTITIONER

## 2022-11-07 NOTE — PROGRESS NOTES
Hu Rodriguez  11/7/2022  [address]     Christ-en- Y Gastric Bypass  6 month Post-Operative Follow-up     Hu Rodriguez is a 45 y.o. female who is 6 months post Laparoscopic Christ-en-Y Gastric Bypass surgery. Reports that she is doing well. She is not having swallowing difficulty. Patient denies nausea and vomiting. Patient denies gastric reflux symptoms. Bowel movements are normal per patient. Patient is compliant most of the time with the multivitamins and calcium + Vit D. She is not meeting fluid recommendations of at least 64 ounces per day, she is getting around 60 ounces per day. Reports that it makes her feel full. She is meeting protein recommendations. She  is exercising:  high impact aerobics . Patient is  taking fiber supplements, which include 5 fiber one gummie bears per day. Rkmsag=003 lb (79.8 kg)  Today's weight represents a total weight loss of 79 pounds since surgery with 0 pounds regained since the lowest weight. Prior to Admission medications    Medication Sig Start Date End Date Taking?  Authorizing Provider   pantoprazole (PROTONIX) 20 MG tablet Take 1 tablet by mouth daily 10/24/22  Yes Sapna Lee MD   Ferrous Sulfate (IRON PO) Take by mouth   Yes Historical Provider, MD   Calcium Citrate-Vitamin D (CALCIUM + VIT D, BARIATRIC ADVANTAGE, CHEWABLE TABLET) Take 1 tablet by mouth daily   Yes Historical Provider, MD   Multiple Vitamin (MVI, BARIATRIC ADVANTAGE MULTI-FORMULA, CHEW TAB) Take 1 tablet by mouth daily   Yes Historical Provider, MD   omeprazole (PRILOSEC) 20 MG delayed release capsule Take 1 capsule by mouth Daily 6/2/22 6/2/23 Yes Sapna Lee MD   ondansetron (ZOFRAN-ODT) 4 MG disintegrating tablet Take 1 tablet by mouth every 8 hours as needed for Nausea or Vomiting 4/27/22  Yes Sapna Lee MD   Fexofenadine-Pseudoephedrine (ALLEGRA-D PO) Take by mouth daily as needed    Yes Historical Provider, MD   fluticasone (FLONASE) 50 MCG/ACT nasal spray 1 spray by Each Nostril route daily   Yes Historical Provider, MD        Allergies   Allergen Reactions    Codeine Hives           Past Medical History:   Diagnosis Date    Morbid obesity due to excess calories ([address]     PONV (postoperative nausea and vomiting)        Past Surgical History:   Procedure Laterality Date     SECTION      CHOLECYSTECTOMY      CYST REMOVAL      right great toe    MARCO ANTONIO-EN-Y GASTRIC BYPASS N/A 5/3/2022    GASTRIC BYPASS MARCO ANTONIO-EN-Y LAPAROSCOPIC [facility]    EGD BIOPSY performed by Marisa Dhillon MD at [place] [address]       Current Outpatient Medications   Medication Sig Dispense Refill    pantoprazole (PROTONIX) 20 MG tablet Take 1 tablet by mouth daily 30 tablet 3    Ferrous Sulfate (IRON PO) Take by mouth      Calcium Citrate-Vitamin D (CALCIUM + VIT D, BARIATRIC ADVANTAGE, CHEWABLE TABLET) Take 1 tablet by mouth daily      Multiple Vitamin (MVI, BARIATRIC ADVANTAGE MULTI-FORMULA, CHEW TAB) Take 1 tablet by mouth daily      omeprazole (PRILOSEC) 20 MG delayed release capsule Take 1 capsule by mouth Daily 30 capsule 12    ondansetron (ZOFRAN-ODT) 4 MG disintegrating tablet Take 1 tablet by mouth every 8 hours as needed for Nausea or Vomiting 15 tablet 0    Fexofenadine-Pseudoephedrine (ALLEGRA-D PO) Take by mouth daily as needed       fluticasone (FLONASE) 50 MCG/ACT nasal spray 1 spray by Each Nostril route daily       No current facility-administered medications for this visit. Allergies   Allergen Reactions    Codeine Hives       History reviewed. No pertinent family history.     Social History     Socioeconomic History    Marital status:      Spouse name: Not on file    Number of children: Not on file    Years of education: Not on file    Highest education level: Not on file   Occupational History    Not on file   Tobacco Use    Smoking status: Never    Smokeless tobacco: Never   Substance and Sexual Activity    Alcohol use: No    Drug use: No    Sexual activity: Not on file   Other Topics Concern    Not on file   Social History Narrative    Not on file     Social Determinants of Health     Financial Resource Strain: Not on file   Food Insecurity: Not on file   Transportation Needs: Not on file   Physical Activity: Not on file   Stress: Not on file   Social Connections: Not on file   Intimate Partner Violence: Not on file   Housing Stability: Not on file       A complete 10 system review was performed and are otherwise negative unless mentioned in the above HPI. Specific negatives are listed below but may not include all those reviewed.     General ROS: negative obtundation, AMS  ENT ROS: negative rhinorrhea, epistaxis  Allergy and Immunology ROS: negative itchy/watery eyes or nasal congestion  Hematological and Lymphatic ROS: negative spontaneous bleeding or bruising  Endocrine ROS: negative  lethargy, mood swings, palpitations or polydipsia/polyuria  Respiratory ROS: negative sputum changes, stridor, tachypnea or wheezing  Cardiovascular ROS: negative for - loss of consciousness, murmur or orthopnea  Gastrointestinal ROS: negative for - hematochezia or hematemesis  Genito-Urinary ROS: negative for -  genital discharge or hematuria  Musculoskeletal ROS: negative for - focal weakness, gangrene  Psych/Neuro ROS: negative for - visual or auditory hallucinations, suicidal ideation        Physical exam:   BP (!) 147/80 (Site: Left Lower Arm, Position: Sitting, Cuff Size: Medium Adult)   Pulse 79   Temp 97.7 °F (36.5 °C) (Temporal)   Resp 20   Ht 5' 7\" (1.702 m)   Wt 176 lb (79.8 kg)   BMI 27.57 kg/m²    General appearance: alert, appears stated age and cooperative  Head: Normocephalic, without obvious abnormality, atraumatic  Neck: no adenopathy, no carotid bruit, no JVD, supple, symmetrical, trachea midline and thyroid not enlarged, symmetric, no tenderness/mass/nodules  Lungs: clear to auscultation bilaterally  Heart: regular rate and rhythm  Abdomen: soft, non-tender; bowel sounds normal; no masses,  no organomegaly  Extremities: extremities normal, atraumatic, no cyanosis or edema    Assessment: Post Christ-en- Y Gastric Bypass. She does not complain of GERD,  does not have sleep apnea,  does not have diabetes,  does not have hypertension off medical treatment. Cholesterol and triglycerides are normal.    1. Malnutrition following gastrointestinal surgery    - CBC; Future  - Comprehensive Metabolic Panel; Future  - Ferritin; Future  - Triglyceride; Future  - Cholesterol, Total; Future  - Zinc; Future  - Vitamin B12; Future  - Folate; Future  - Vitamin B1; Future  - Vitamin D 25 Hydroxy; Future  - Prealbumin; Future  - Copper, Serum; Future  - Selenium serum; Future    2. Gastroesophageal reflux disease without esophagitis  Will try to wean off of protonix      Plan:  Continue to eat a high protein, low calorie diet, eat small portions very slowly and chew well before swallowing. Drink plenty of water and fluids. Make sure to use fiber to keep the bowels regular. Try to exercise 7 days per week, maintain adequate variety and balance and increase intake of: fluids. Always notify the clinic if you have any medical problems. Follow up in 6 months.       Physician Signature: Electronically signed by PHILIP Leung CNP

## 2022-11-07 NOTE — PROGRESS NOTES
Patient is 6 mo LRYGB. Water intake is around 60 oz daily. Protein through shakes and foods. Vitamin intake is good. Bowel movements are good. Scheduled with Angel Castrejon for dietary.

## 2022-11-07 NOTE — PATIENT INSTRUCTIONS
Please continue to take your vitamin and mineral supplements as instructed. If you received a blood work prescription today for laboratory monitoring due prior to your next routine follow-up visit, please have this blood work obtained 10 to 14 days prior to your next visit. It is important to fast for 12 hours prior to routine weight loss surgery blood work, EXCEPT for drinking water, to ensure accuracy of results. Please report nausea, vomiting, abdominal pain, or any other problems you experience to your surgeon. For problems related to weight loss surgery, it is best to go to [address] [place] [address] Emergency Department and have your surgeon paged.

## 2022-11-07 NOTE — PROGRESS NOTES
Medical Nutrition Therapy (MNT) Assessment     Pt. Name: [facility]   Date:11/7/2022  F/U Appt: Sx Type 6 mos rygb      Ht 5' 7\" (1.702 m)   Wt 176 lb (79.8 kg)   BMI 27.57 kg/m²  Height: 5' 7\" (1.702 m) Weight: 176 lb (79.8 kg)   IBW:ideal body weight   163 lbs % EBWL: 85%     Wt Readings from Last 3 Encounters:   11/07/22 176 lb (79.8 kg)   11/07/22 176 lb (79.8 kg)   08/03/22 206 lb (93.4 kg)                     Protein supplements:   Protein currently using   Nectar  Daily grams of protein 75-80 plus  Daily Protein needs (based on 1.0 gram per kg of IBW)  74-84 grams       Subjective:                    MNT ADVANCE TO:     Bariatric soft diet introducing raw fruit, raw vegetables, rice, protein bars, multivitamin, calcium, protein supplements      Allergies and Food Allergies and Food Intolerances:[place] and talapia and shrimp not [facility] Bowel Protocol:  no - Diarrhea  No - Constipation  How much plain water are you drinking daily 51 oz  No - Are you taking Colace x2 daily    Yes - Are you taking Sugar Free Chewable Fiber Gummies  5 daily            How many meals a day 5 / Portions Sizes of Meals are 4 oz         Labs: none today    Supplements: Bariatric Advantage Multi-Vitamin 1 tablet 2 times Daily, Bariatric Advantage 29 mgs Iron 1 tablet Daily, Bariatric Advanatge Calcium Lozenges 1 tablet 3 times Daily , Dry Vitamin D3 5,000iu every day    Estimated Daily Nutritional Needs: Based on Bariatric procedure for Wt. Loss  Energy: Will be calculated at Maintenance Stage    Protein: 60 - 80 gms Daily    MNT Plan and Additional Education: RD/LD reviewed Bariatric Soft Diet incorporating in Raw Fruits, Raw Vegetables, Red Meat, and Rice. Encouraged pt to meet protein and fluid needs daily. Handouts given. Pt. verbalized understanding. Pt instructed to call with questions.      MEDICAL NUTRITION THERAPY (MNT) - Nutrition Care Plan   (The patient has been educated and given written education material that reinforces the following dietary guidelines for Bariatric Surgery)  Goal:  Patient able to verbalize the following dietary concepts:  3 to 4 ounce portions per meal     6 small meals daily      60 to 80 grams of protein   48 to 64 ounces of fluid daily (water)     Always consume protein item first with all meals   Pt is aware wt loss is pt controlled. Slow Meals - 30-35 chews per bite / Meals 30 - 45 minutes long            The RD / LD reviewed with the patient that the dietary goals of the bariatric patient is to ensure that patient is able to meet established nutritional needs for a Bariatric Surgery  Patient at this time in order to promote healing, prevent significant weight changes, prevent skin breakdown and abnormal lab values, prevent complications, and tolerance to diet and texture of foods. Pt is able to identify proper food choices and needed changes and is able to explain proper food preparation. RD / LD reviewed compliance with assigned diet stages, volume of food and drink consumed, timing of meals, amount of protein and energy intake, daily vitamin and mineral supplementation, identify food cravings and any adverse reactions associated with intake of food and drink. RD / LD uses behavioral tools such as goal setting, MI, and self-monitoring, to reinforce the anatomic and physiologic effects of the surgery, so that the described behaviors become more of a habit not simply a reaction to the altered anatomy. Care Plan:   Rd/Ld Addressed Food Records or 24-Hour Recall  Rd / Ld encouraged patient to exercise at least 30 minutes daily  Rd / Ld instructed patient on how to increase oral protein intake within the diet. Pt. can verbalize he / she will need to consume 60 - 80 grams. Rd / Ld instructed the patient on how to increase the use of protein supplements within the diet. Pt. was instructed on how to increase water intake.  Patient will need to consume 48 - 64 oz. of just plain water in addition to 30 oz. of non-caloric beverages. Handouts given to patient  RD / LD encouraged oral intake    RD / LD encouraged pt. to keep food records.       Pt. is able to verbalize diet concepts         Yes - Pt. diet was advanced to the following stage ( See above)     Good - Dietary Compliance

## 2023-05-15 ENCOUNTER — HOSPITAL ENCOUNTER (OUTPATIENT)
Age: 53
Discharge: HOME | End: 2023-05-15
Payer: COMMERCIAL

## 2023-05-15 LAB
ALBUMIN SERPL-MCNC: 4.2 G/DL (ref 3.5–5.2)
ALP SERPL-CCNC: 146 U/L (ref 35–104)
ALT SERPL-CCNC: 22 U/L (ref 0–32)
ANION GAP SERPL CALCULATED.3IONS-SCNC: 12 MMOL/L (ref 7–16)
AST SERPL-CCNC: 25 U/L (ref 0–31)
BILIRUB SERPL-MCNC: 0.3 MG/DL (ref 0–1.2)
BUN SERPL-MCNC: 14 MG/DL (ref 6–20)
CALCIUM SERPL-MCNC: 9.9 MG/DL (ref 8.6–10.2)
CHLORIDE SERPL-SCNC: 103 MMOL/L (ref 98–107)
CHOLESTEROL, TOTAL: 134 MG/DL (ref 0–199)
CO2 SERPL-SCNC: 27 MMOL/L (ref 22–29)
CREAT SERPL-MCNC: 0.8 MG/DL (ref 0.5–1)
ERYTHROCYTE [DISTWIDTH] IN BLOOD BY AUTOMATED COUNT: 12.2 FL (ref 11.5–15)
FERRITIN SERPL-MCNC: 91 NG/ML
FOLATE SERPL-MCNC: 9.3 NG/ML (ref 4.8–24.2)
GLUCOSE SERPL-MCNC: 86 MG/DL (ref 74–99)
HCT VFR BLD AUTO: 39.9 % (ref 34–48)
HGB BLD-MCNC: 13.1 G/DL (ref 11.5–15.5)
MCH RBC QN AUTO: 29.7 PG (ref 26–35)
MCHC RBC AUTO-ENTMCNC: 32.8 % (ref 32–34.5)
MCV RBC AUTO: 90.5 FL (ref 80–99.9)
PLATELET # BLD AUTO: 299 E9/L (ref 130–450)
PMV BLD AUTO: 9.8 FL (ref 7–12)
POTASSIUM SERPL-SCNC: 4 MMOL/L (ref 3.5–5)
PREALB SERPL-MCNC: 20 MG/DL (ref 20–40)
PROT SERPL-MCNC: 7.7 G/DL (ref 6.4–8.3)
RBC # BLD AUTO: 4.41 E12/L (ref 3.5–5.5)
SODIUM SERPL-SCNC: 142 MMOL/L (ref 132–146)
TRIGL SERPL-MCNC: 62 MG/DL (ref 0–149)
VIT B12 SERPL-MCNC: 1270 PG/ML (ref 211–946)
VITAMIN D 25-HYDROXY: 60 NG/ML (ref 30–100)
WBC # BLD: 4 E9/L (ref 4.5–11.5)

## 2023-05-15 PROCEDURE — 84425 ASSAY OF VITAMIN B-1: CPT

## 2023-05-15 PROCEDURE — 82525 ASSAY OF COPPER: CPT

## 2023-05-15 PROCEDURE — 82607 VITAMIN B-12: CPT

## 2023-05-15 PROCEDURE — 84255 ASSAY OF SELENIUM: CPT

## 2023-05-15 PROCEDURE — 82306 VITAMIN D 25 HYDROXY: CPT

## 2023-05-15 PROCEDURE — 82728 ASSAY OF FERRITIN: CPT

## 2023-05-15 PROCEDURE — 82746 ASSAY OF FOLIC ACID SERUM: CPT

## 2023-05-15 PROCEDURE — 85027 COMPLETE CBC AUTOMATED: CPT

## 2023-05-15 PROCEDURE — 82465 ASSAY BLD/SERUM CHOLESTEROL: CPT

## 2023-05-15 PROCEDURE — 36415 COLL VENOUS BLD VENIPUNCTURE: CPT

## 2023-05-15 PROCEDURE — 84134 ASSAY OF PREALBUMIN: CPT

## 2023-05-15 PROCEDURE — 84630 ASSAY OF ZINC: CPT

## 2023-05-15 PROCEDURE — 84478 ASSAY OF TRIGLYCERIDES: CPT

## 2023-05-15 PROCEDURE — 80053 COMPREHEN METABOLIC PANEL: CPT

## 2023-05-18 LAB
COPPER SERPL-MCNC: 186.4 UG/DL (ref 80–155)
SELENIUM SERPL-MCNC: 273.3 UG/L (ref 23–190)
ZINC SERPL-MCNC: 94.9 UG/DL (ref 60–120)

## 2023-05-20 LAB — VIT B1 BLD-MCNC: 134 NMOL/L (ref 70–180)

## 2023-05-31 ENCOUNTER — OFFICE VISIT (OUTPATIENT)
Dept: BARIATRICS/WEIGHT MGMT | Age: 53
End: 2023-05-31
Payer: COMMERCIAL

## 2023-05-31 VITALS
TEMPERATURE: 98.1 F | DIASTOLIC BLOOD PRESSURE: 92 MMHG | WEIGHT: 167 LBS | RESPIRATION RATE: 20 BRPM | HEIGHT: 67 IN | SYSTOLIC BLOOD PRESSURE: 129 MMHG | BODY MASS INDEX: 26.21 KG/M2 | HEART RATE: 73 BPM

## 2023-05-31 DIAGNOSIS — K91.2 MALNUTRITION FOLLOWING GASTROINTESTINAL SURGERY: Primary | ICD-10-CM

## 2023-05-31 PROCEDURE — G8417 CALC BMI ABV UP PARAM F/U: HCPCS | Performed by: SURGERY

## 2023-05-31 PROCEDURE — 1036F TOBACCO NON-USER: CPT | Performed by: SURGERY

## 2023-05-31 PROCEDURE — 99213 OFFICE O/P EST LOW 20 MIN: CPT | Performed by: SURGERY

## 2023-05-31 PROCEDURE — G8427 DOCREV CUR MEDS BY ELIG CLIN: HCPCS | Performed by: SURGERY

## 2023-05-31 PROCEDURE — 99211 OFF/OP EST MAY X REQ PHY/QHP: CPT

## 2023-05-31 PROCEDURE — 3017F COLORECTAL CA SCREEN DOC REV: CPT | Performed by: SURGERY

## 2023-05-31 NOTE — PATIENT INSTRUCTIONS
Please continue to take your vitamin and mineral supplements as instructed. If you received a blood work prescription today for laboratory monitoring due prior to your next routine follow-up visit, please have this blood work obtained 10 to 14 days prior to your next visit. It is important to fast for 12 hours prior to routine weight loss surgery blood work, EXCEPT for drinking water, to ensure accuracy of results. Please report nausea, vomiting, abdominal pain, or any other problems you experience to your surgeon. For problems related to weight loss surgery, it is best to go to [address] [place] [address] Emergency Department and have your surgeon paged.

## 2023-05-31 NOTE — PROGRESS NOTES
Hu Rodriguez  5/31/2023  [address]     Christ-en- Y Gastric Bypass  1 Year Post-Operative Follow-up     Hu Rodriguez is a 45 y.o. female who is 1years post Laparoscopic Christ-en-Y Gastric Bypass surgery. Reports no issues. She is not having swallowing difficulty, is compliant most of the time with the multivitamins and calcium + Vit D. She is meeting fluid recommendations of at least 64 ounces per day and is meeting protein recommendations. Ofhbao=534 lb (75.8 kg)  Today's weight represents a total weight loss of 88 pounds since surgery with 0 pounds regained since the lowest weight. Prior to Admission medications    Medication Sig Start Date End Date Taking?  Authorizing Provider   pantoprazole (PROTONIX) 20 MG tablet Take 1 tablet by mouth daily 10/24/22  Yes Danna Woods MD   Ferrous Sulfate (IRON PO) Take by mouth   Yes Historical Provider, MD   Calcium Citrate-Vitamin D (CALCIUM + VIT D, BARIATRIC ADVANTAGE, CHEWABLE TABLET) Take 1 tablet by mouth daily   Yes Historical Provider, MD   Multiple Vitamin (MVI, BARIATRIC ADVANTAGE MULTI-FORMULA, CHEW TAB) Take 1 tablet by mouth daily   Yes Historical Provider, MD   omeprazole (PRILOSEC) 20 MG delayed release capsule Take 1 capsule by mouth Daily 6/2/22 6/2/23 Yes Danna Woods MD   ondansetron (ZOFRAN-ODT) 4 MG disintegrating tablet Take 1 tablet by mouth every 8 hours as needed for Nausea or Vomiting 4/27/22  Yes Danna Woods MD   Fexofenadine-Pseudoephedrine (ALLEGRA-D PO) Take by mouth daily as needed    Yes Historical Provider, MD   fluticasone (FLONASE) 50 MCG/ACT nasal spray 1 spray by Each Nostril route daily   Yes Historical Provider, MD          Physical exam:   BP (!) 129/92 (Site: Left Upper Arm, Position: Sitting, Cuff Size: Large Adult)   Pulse 73   Temp 98.1 °F (36.7 °C) (Temporal)   Resp 20   Ht 5' 7\" (1.702 m)   Wt 167 lb (75.8 kg)   BMI 26.16 kg/m²    General appearance:

## 2023-10-19 ENCOUNTER — HOSPITAL ENCOUNTER (EMERGENCY)
Age: 53
Discharge: HOME | End: 2023-10-20
Attending: STUDENT IN AN ORGANIZED HEALTH CARE EDUCATION/TRAINING PROGRAM
Payer: COMMERCIAL

## 2023-10-19 ENCOUNTER — APPOINTMENT (OUTPATIENT)
Dept: CT IMAGING | Age: 53
End: 2023-10-19
Payer: COMMERCIAL

## 2023-10-19 ENCOUNTER — APPOINTMENT (OUTPATIENT)
Dept: GENERAL RADIOLOGY | Age: 53
End: 2023-10-19
Payer: COMMERCIAL

## 2023-10-19 VITALS
TEMPERATURE: 98.6 F | HEART RATE: 77 BPM | WEIGHT: 165.34 LBS | OXYGEN SATURATION: 100 % | RESPIRATION RATE: 18 BRPM | BODY MASS INDEX: 25.95 KG/M2 | HEIGHT: 67 IN | DIASTOLIC BLOOD PRESSURE: 92 MMHG | SYSTOLIC BLOOD PRESSURE: 136 MMHG

## 2023-10-19 DIAGNOSIS — S66.911A STRAIN OF BOTH WRISTS, INITIAL ENCOUNTER: ICD-10-CM

## 2023-10-19 DIAGNOSIS — S09.90XA CLOSED HEAD INJURY, INITIAL ENCOUNTER: Primary | ICD-10-CM

## 2023-10-19 DIAGNOSIS — S20.211A CONTUSION OF RIGHT CHEST WALL, INITIAL ENCOUNTER: ICD-10-CM

## 2023-10-19 DIAGNOSIS — S62.633A CLOSED DISPLACED FRACTURE OF DISTAL PHALANX OF LEFT MIDDLE FINGER, INITIAL ENCOUNTER: ICD-10-CM

## 2023-10-19 DIAGNOSIS — S66.912A STRAIN OF BOTH WRISTS, INITIAL ENCOUNTER: ICD-10-CM

## 2023-10-19 DIAGNOSIS — S39.012A BACK STRAIN, INITIAL ENCOUNTER: ICD-10-CM

## 2023-10-19 PROCEDURE — 73590 X-RAY EXAM OF LOWER LEG: CPT

## 2023-10-19 PROCEDURE — 73110 X-RAY EXAM OF WRIST: CPT

## 2023-10-19 PROCEDURE — 99284 EMERGENCY DEPT VISIT MOD MDM: CPT

## 2023-10-19 PROCEDURE — 72125 CT NECK SPINE W/O DYE: CPT

## 2023-10-19 PROCEDURE — 70450 CT HEAD/BRAIN W/O DYE: CPT

## 2023-10-19 PROCEDURE — 71046 X-RAY EXAM CHEST 2 VIEWS: CPT

## 2023-10-19 PROCEDURE — 72100 X-RAY EXAM L-S SPINE 2/3 VWS: CPT

## 2023-10-19 PROCEDURE — 73130 X-RAY EXAM OF HAND: CPT

## 2023-10-19 ASSESSMENT — PAIN DESCRIPTION - DESCRIPTORS: DESCRIPTORS: ACHING

## 2023-10-19 ASSESSMENT — PAIN DESCRIPTION - PAIN TYPE: TYPE: ACUTE PAIN

## 2023-10-19 ASSESSMENT — PAIN - FUNCTIONAL ASSESSMENT: PAIN_FUNCTIONAL_ASSESSMENT: 0-10

## 2023-10-19 ASSESSMENT — PAIN SCALES - GENERAL: PAINLEVEL_OUTOF10: 8

## 2023-10-19 ASSESSMENT — PAIN DESCRIPTION - ORIENTATION: ORIENTATION: LEFT

## 2023-10-19 ASSESSMENT — PAIN DESCRIPTION - LOCATION: LOCATION: FINGER (COMMENT WHICH ONE)
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
## 2023-10-20 ENCOUNTER — APPOINTMENT (OUTPATIENT)
Dept: CT IMAGING | Age: 53
End: 2023-10-20
Payer: COMMERCIAL

## 2023-10-20 PROCEDURE — 72128 CT CHEST SPINE W/O DYE: CPT

## 2023-10-20 PROCEDURE — 72131 CT LUMBAR SPINE W/O DYE: CPT
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
## 2023-11-01 ENCOUNTER — OFFICE VISIT (OUTPATIENT)
Dept: BARIATRICS/WEIGHT MGMT | Age: 53
End: 2023-11-01
Payer: COMMERCIAL

## 2023-11-01 VITALS
HEIGHT: 67 IN | SYSTOLIC BLOOD PRESSURE: 133 MMHG | BODY MASS INDEX: 27 KG/M2 | HEART RATE: 67 BPM | TEMPERATURE: 97.7 F | DIASTOLIC BLOOD PRESSURE: 73 MMHG | RESPIRATION RATE: 20 BRPM | WEIGHT: 172 LBS

## 2023-11-01 DIAGNOSIS — K91.2 MALNUTRITION FOLLOWING GASTROINTESTINAL SURGERY: Primary | ICD-10-CM

## 2023-11-01 PROCEDURE — 99211 OFF/OP EST MAY X REQ PHY/QHP: CPT

## 2023-11-01 PROCEDURE — G8427 DOCREV CUR MEDS BY ELIG CLIN: HCPCS | Performed by: SURGERY

## 2023-11-01 PROCEDURE — G8484 FLU IMMUNIZE NO ADMIN: HCPCS | Performed by: SURGERY

## 2023-11-01 PROCEDURE — 3017F COLORECTAL CA SCREEN DOC REV: CPT | Performed by: SURGERY

## 2023-11-01 PROCEDURE — 1036F TOBACCO NON-USER: CPT | Performed by: SURGERY

## 2023-11-01 PROCEDURE — G8417 CALC BMI ABV UP PARAM F/U: HCPCS | Performed by: SURGERY

## 2023-11-01 PROCEDURE — 99214 OFFICE O/P EST MOD 30 MIN: CPT | Performed by: SURGERY

## 2023-11-01 NOTE — PATIENT INSTRUCTIONS
Please continue to take your vitamin and mineral supplements as instructed. If you received a blood work prescription today for laboratory monitoring due prior to your next routine follow-up visit, please have this blood work obtained 10 to 14 days prior to your next visit. It is important to fast for 12 hours prior to routine weight loss surgery blood work, EXCEPT for drinking water, to ensure accuracy of results. Please report nausea, vomiting, abdominal pain, or any other problems you experience to your surgeon. For problems related to weight loss surgery, it is best to go to [facility] Emergency Department and have your surgeon paged.     Last Surgical Weight Loss:      11/1/2023    12:49 PM   Surgical Weight Loss Tracker   Consult Date 11/3/2021   Initial Height 5' 7.32\"   Initial Weight 269 lb   Initial BMI 41.73   Ideal Body Weight 163 lb   Surgery Date 5/3/2022   Pre-Surgical Height 5' 7.32\"   Pre-Surgical Weight 277 lb   Pre Surgery BMI 42.97   Weight to Lose 114 lb   Date 11/1/2023   Height 5' 7.32\"   Weight 172 lb   BMI 26.68   Weight Change -105 lb   Total Weight Change -105 lb   % EBWL 92%
[Time Spent: ___ minutes] : I have spent [unfilled] minutes of time on the encounter.

## 2023-11-01 NOTE — PROGRESS NOTES
[address]  11/1/2023  [address]    Christ-en- Y Gastric Bypass  1.5 Year Post-Operative Follow-up     [address] is a 52 y.o. female who is 1.5 years post Laparoscopic Christ-en-Y Gastric Bypass surgery. Reports no issues. She is not having swallowing difficulty, is compliant most of the time with the multivitamins and calcium + Vit D. She is meeting fluid recommendations of at least 64 ounces per day and is meeting protein recommendations. She  is exercising: no regular exercise. Last Surgical Weight Loss:      11/1/2023    12:49 PM   Surgical Weight Loss Tracker   Consult Date 11/3/2021   Initial Height 5' 7.32\"   Initial Weight 269 lb   Initial BMI 41.73   Ideal Body Weight 163 lb   Surgery Date 5/3/2022   Pre-Surgical Height 5' 7.32\"   Pre-Surgical Weight 277 lb   Pre Surgery BMI 42.97   Weight to Lose 114 lb   Date 11/1/2023   Height 5' 7.32\"   Weight 172 lb   BMI 26.68   Weight Change -105 lb   Total Weight Change -105 lb   % EBWL 92%         Prior to Admission medications    Medication Sig Start Date End Date Taking?  Authorizing Provider   pantoprazole (PROTONIX) 20 MG tablet Take 1 tablet by mouth daily 10/24/22  Yes Ashok Hoyt MD   Ferrous Sulfate (IRON PO) Take by mouth   Yes ProviderObey MD   Calcium Citrate-Vitamin D (CALCIUM + VIT D, BARIATRIC ADVANTAGE, CHEWABLE TABLET) Take 1 tablet by mouth daily   Yes Obey Chambers MD   Multiple Vitamin (MVI, BARIATRIC ADVANTAGE MULTI-FORMULA, CHEW TAB) Take 1 tablet by mouth daily   Yes Obey Chambers MD   omeprazole (PRILOSEC) 20 MG delayed release capsule Take 1 capsule by mouth Daily 6/2/22 11/1/23 Yes Ashok Hoyt MD   ondansetron (ZOFRAN-ODT) 4 MG disintegrating tablet Take 1 tablet by mouth every 8 hours as needed for Nausea or Vomiting 4/27/22  Yes Ashok Hoyt MD   Fexofenadine-Pseudoephedrine (ALLEGRA-D PO) Take by mouth daily as needed    Yes Provider

## 2023-11-30 ENCOUNTER — HOSPITAL ENCOUNTER (OUTPATIENT)
Dept: GENERAL RADIOLOGY | Age: 53
Discharge: HOME | End: 2023-12-02
Payer: COMMERCIAL

## 2023-11-30 ENCOUNTER — HOSPITAL ENCOUNTER (OUTPATIENT)
Age: 53
Discharge: HOME | End: 2023-12-02
Payer: COMMERCIAL

## 2023-11-30 DIAGNOSIS — R52 PAIN: ICD-10-CM

## 2023-11-30 PROCEDURE — 73140 X-RAY EXAM OF FINGER(S): CPT

## 2024-05-10 NOTE — PLAN OF CARE
Your Child's Health  Over 15 Year Old      Namrata Anguiano  May 10, 2024    Visit Vitals  BP (!) 96/58 (BP Location: LUE - Left upper extremity, Patient Position: Sitting, Cuff Size: Regular)   Pulse 66   Temp 97.9 °F (36.6 °C) (Temporal)   Ht 5' 3.25\" (1.607 m)   Wt 43 kg (94 lb 12.8 oz)   LMP 01/10/2024 (Approximate)   SpO2 99%   BMI 16.66 kg/m²     Weight: 94.8 lbs      Your Teen Check-Up Visit  Body Image  Teens are faced with a lot of pressure from the media to look a certain way. Many of the body images portrayed in the media are not healthy. The age old combination of healthy eating and being physically active is the best way to stay at a healthy weight.    Eating, Drinking & Exercise  As teens spend more time away from home, the temptation to eat more high-fat, high calorie convenience foods is common. (It's also tempting to eat more than you need, especially when hanging out with friends, but it's important to stop eating when you feel full.) Learn about how to make healthy choices when not at home, and consider carrying healthy snacks from home rather than relying on vending machines and fast food when out. (The [employer]'s choosemyplate.gov is a great educational website about nutrition.) Getting enough vitamin D and calcium is important for good bone health. Teens need to get 3 to 4 servings of a good source of calcium daily (low-fat or skim milk, yogurt, cheese, calcium-fortified orange juice or other calcium-fortified foods). Vitamin D is needed along with calcium to optimize bone health; 600 IU of vitamin D daily is recommended. Most people cannot meet their vitamin D needs with their usual diet, so a multivitamin with iron supplement is a good way to get it. (A pure vitamin D supplement with 400 or 600 IU is also okay.)    Choosing to drink plenty of water and avoiding or limiting sodas, energy drinks, juices and other sweet drinks (iced tea, etc) is an easy and healthy choice to make. It is common for  Problem: Discharge Planning  Goal: Discharge to home or other facility with appropriate resources  Outcome: Progressing  Flowsheets (Taken 5/4/2022 1205)  Discharge to home or other facility with appropriate resources:   Identify barriers to discharge with patient and caregiver   Arrange for needed discharge resources and transportation as appropriate     Problem: Pain  Goal: Verbalizes/displays adequate comfort level or baseline comfort level  Outcome: Progressing  Flowsheets (Taken 5/4/2022 1205)  Verbalizes/displays adequate comfort level or baseline comfort level:   Encourage patient to monitor pain and request assistance   Assess pain using appropriate pain scale   Administer analgesics based on type and severity of pain and evaluate response   Implement non-pharmacological measures as appropriate and evaluate response     Problem: Safety - Adult  Goal: Free from fall injury  Outcome: Completed     Problem: ABCDS Injury Assessment  Goal: Absence of physical injury  Outcome: Completed teenagers to skip breakfast due to time constraints and simply not feeling hungry in the morning. However, eating something healthy in the morning is important in order to function best at school and avoid overeating later in the day. Teens should set a goal of being physically active for at least 60 minutes a day. This can be tough because of more homework and because gym classes are often not required in the higher grades. If you're not involved in sports, find activities that you like, such as biking, swimming, and dancing. [When participating in sports, make sure to use appropriate safety equipment (helmet, mouth guard, eye protection, wrist guards, elbow and knee pads, athletic supporter).] Choose biking and walking as your mode of transportation whenever it is safe to do so. Choosing to spend time on your phone or computer is a lot easier and a lot more tempting than putting your electronics down and making yourself move - but it is really important to your overall health.      Sleep   Teenagers need a lot of sleep. It can be difficult because most teens don't feel the need to go to sleep until later in the evening, and then they have to get up for school before they've had adequate rest. Keeping your phone, TV, and other electronic media out of your room and avoiding using them in the hour so before bedtime can help you sleep better. Also, avoid drinking a lot of caffeine, especially later in the day.     Dental  It is important to take good care of your permanent teeth - this is the last set you are going to get! Brush at least twice a day (always before bed) with fluoridated toothpaste, floss regularly and see a dentist twice a year. If your home water supply is from a well, let us know - fluoride supplements may be recommended up to 16 years of age.    Violence & Bullying  Violence is out there. Any exposure to violence (as a victim, witness or perpetrator) is dangerous and harmful (emotionally and  physically). Do your best to avoid situations where you know there is a risk of violence, bullying or fighting. Try to stay in a group when you are going between places or on outings. If you are being teased or bullied, stand up to the person doing it if you can--remember, bullies want to see their victims upset, so be calm, don’t appear flustered, tell them to stop it and walk away. Laugh at them if you can; joking will throw a bully off guard because that is not the response they expect. If you or someone you know is being bullied or harmed, ask a guidance counselor, , health care provider or other trusted adult about what you can do to resolve the situation. Most schools have strict policies in place to protect against bullying. Don’t start skipping school to avoid a bully; talk to someone because things can be better.     Be very careful about what you post online--to protect yourself from being attacked as well as to make sure something you post will not be interpreted as hurtful or critical. Nothing is truly private in cyberspace; make sure you do not post anything online (texts, photos, emails) that you would not be comfortable having read out loud in a public place.     If you are dating, violence should NEVER be tolerated in a relationship-- this includes physical violence, emotional abuse (shaming, embarrassing, threatening, controlling) or sexual abuse (forcing a partner to participate in a sex act when they do not or cannot consent to it).  If you are uncomfortable with anything in your relationship, talk about it now. If you can’t talk to your partner, talk to a trusted adult, guidance counselor, teacher, or health care provider. If you (or a friend) need help right away, there are hotlines are available. One is through [employer]: call 1-528.602.9461, chat at loveisrespect.org or text “ivis” to 74416 (available 24/7/365). You can also call the National Domestic Violence  1-348.697.4503.     Healthy Emotions  Being a teen can be tough. Demands of school, relationship challenges (friends, family, dating), and new responsibilities and expectations can lead to stress that may sometimes seem overwhelming. Depression and anxiety can affect teens; they can interfere with your day to day functioning and keep you from enjoying things that you usually enjoy. When you're feeling stressed out and overwhelmed, the most important thing to do is talk to someone that you trust and who cares about you. Alcohol, drugs or self-harming acts will not solve any of your problems and will ultimately only make things worse. If you (or a friend) are ever feeling overwhelmed by sadness or fear or anxiety to the point that don't feel you can do what you need to do from day to day or that you wished you were not alive, you need to ask for help. If you don't have a trusted adult in your life, talk to a friend, a teacher, a counselor or health care provider. There is an anonymous emergency hotline (through National Suicide Prevention Lifeline) for teens who are feeling desperate enough to hurt themselves: chat at https://suicidepreventionlifeline.org/ or call 1-810.860.2191. Hopefully you will get through your teenage years without any significant emotional difficulties, but you may have friends who struggle. Be there for them, and help them get help if you can see that they need it.    Sex, Drugs, & Rock n Roll  Thinking about sex and relationships at your age is normal. Teens may have questions about their sexual orientation and gender identity. You can always talk to your healthcare providers when you have questions about these issues. You should know that everyone is entitled to complete, nonjudgmental and confidential health at this clinic. We also encourage you to talk to your family and friends, if you believe they will be supportive. If you are unable to talk to your family or if you need additional  support, there are plenty of resources which we can refer you to. Good online resources include https://www.cdc.gov/lgbthealth/youth-resources.htm and http://www.[facility].org/programs/youth/.    You are undoubtedly aware of the risks associated with having sex. Because teens who have babies or father a child are faced with the tremendous responsibilities and challenges of caring for a child, their own goals and dreams become more difficult to fulfill and may even be altered because of the responsibility of caring for a child. Also, pregnancy can carry high medical risks in young teens. There are several contraception (birth control) options for teens to help prevent pregnancy - but the most effective one is still choosing not to have sex. (Large national studies of teenagers show that most teens who had sex at a young age wish they had waited longer.) Sexually-transmitted infections (STIs) are another major risk of having sex. It is estimated that about 20 million new STIs occur every year, and about half of those are in teens and young adults between 15 to 24 years of age. Female teens, in particular, are at risk for complications from STIs; some of which can cause them to have problems with their fertility (their ability to have children) later in life when they want to be pregnant. To protect against STI's, condoms need to be used during every sexual encounter, even if a female is using another form of contraception. For teens who choose to be sexually active, it is very important that they have regular health care check-ups to discuss contraception and perform STI screening if indicated. Plus, you should see a health care provided any time you are concerned about a possible problem (pregnancy, STI) or want to discuss birth control. Despite what you see on TV and in the media, you should know that most teenagers your age choose NOT to have sex. There is a lot of pressure out there for teens to experiment sexually,  so make decisions to avoid places (and people) where you know that kind of pressure might be put on you. For reliable information about sex and birth control, good websites are safeteens.org and bedsider.org.    If you make the decision to become sexually active, you should know that in the [facility], teens who are age 14 or older are entitled to confidential reproductive health care - that means that you can talk to your providers and receive care for sex-related issues (contraception, STIs, pregnancy) without your parents being notified. We encourage teens to talk to their parents when they are considering starting to have sex, but not everyone can--that is why this law exists. (The only exception to confidential care would be if your providers believe you are at risk for harming yourself or someone else.) Sexually active teens should learn about emergency contraception (\"the morning after pill \"or \"Plan B) which can significantly reduce the chance of pregnancy if a young woman takes it shortly after having unprotected sex. You should also know that even if you make the decision to have sex once, you do not have to keep doing it if you didn’t feel like it was the right decision for you.     Most teens have heard plenty by now about the dangers and health risks of alcohol, drugs and smoking. Some people think vaping is a safe alternative to smoking, but there is no proof that it is. Any inhaled substance is going to cause harm to your lungs and most can cause harm to the brain as well. You should not take prescription medicines if they were not prescribed for you, and you should never let anyone else take your prescription drugs. Avoid situations where you know there is likely to be alcohol and drugs which you will be pressured to try; hang out with friends who share your decision not to use these substances. In addition to the dangers associated with drug use, legal involvement and drug testing policies  mean using drugs today can interfere with sports, job and college opportunities. You can talk to your health care provider about drug use if you have questions or concerns; good online resources include https://teens.drugabuse.gov/ and https://www.thecoSkyhood.gov/real_life4.aspx  .    Industries have rules about protecting workers from loud noises because they are known to cause hearing loss. Nearly 6% of teenagers were identified as having a mild level of permanent hearing loss due to loud music and ear bud use in a national study. To protect your hearing, avoid prolonged use of earbuds and music at loud volumes and protect yourself with earplugs or other hearing protection devices) when exposed to loud noises (concerts, lawnmowers, snowblowers, etc.).    Safety on Wheels  The leading cause of death for adolescents is motor vehicle accidents. Wearing a seatbelt significantly reduces your chance of serious injury or death when riding in a car. If you ever find yourself in a situation where do not feel safe with the  of your car (whether that is yourself or someone else), the right decision is to call for a ride from someone else. This could be a matter of life or death-do not hesitate to make this type of call.    Texting, calling or using any kind of electronic device is distracting to a  and the cause of many serious accidents. Whether it is you or someone else driving, drivers should never be using mobile devices when they are behind the wheel. Put your phone out of reach or in the trunk if you do not trust yourself to ignore it while driving.    Safety in the Sun  Protecting yourself from the sun’s UV radiation is your responsibility - your parent is no longer going to jeanine after you to apply sunscreen. Whether you tan or burn, spending time in the sun without sunscreen protection increases your risk of skin cancer and premature skin aging. To protect yourself, always wear a generous amount of  sunscreen with an SPF of 15 or higher, reapply it frequently, avoid prolonged time in the sun between 11 AM and 3 PM (when the sun is the hottest), and wear sunglasses and sun protective clothing when in the sun. Use of tanning beds further increases the risk of skin cancer; tanning bed use is illegal for teens under age 16 years old in [place] and many  want  to increase that law to apply to everyone under 18 years of age because of the health risks.    Helmets should always be worn in riding a skateboard, bike, motorcycle, or ATV. They should also be worn when skating or skateboarding. When boating or doing water sports, always wear a [employer] approved lifejacket, even if you feel you are a good swimmer.    Gun Safety  Firearms are dangerous. If someone you know has a firearm, you should not attempt handle it or use it. Gun safety courses are usually available for teens who want to hunt, but even with training, you should never handle or use a firearm without experienced supervision. Carrying a handgun for protection is not recommended because it is dangerous, and it is illegal under the age of 21.      MEDICATION FOR FEVER OR PAIN:   Acetaminophen liquid (e.g., Tylenol or Tempra) may be given every four hours as needed for pain or fever.  Acetaminophen liquid is less concentrated than the infant dropper bottle type.  Be sure to check which product CONCENTRATION you are using.      CHILDREN’S Tylenol/Acetaminophen  (160 MG/5 mL)    Child’s Weight:  Dose:  36 - 47 pounds:    240 mg (7.5 mL (1 1/2 Teaspoons))  48 - 59 pounds:    320 mg (10.0 mL (2 Teaspoons))  60 - 71 pounds:    400 mg (12.5 mL (2 1/2 Teaspoons))  72 - 95 pounds:    480 mg (15.0 mL (3 Teaspoons))  Greater than 96 pounds:   640 mg (20.0 mL (4 Teaspoons))    CHILDREN’S Tylenol/Acetaminophen MELTAWAYS ( 80 MG tablets)    Child’s Weight:  Dose:  36 - 47 pounds:    240 mg (3 meltaway tablets)  48 - 59 pounds:    320 mg (4 meltaway  tablets)  60 - 71 pounds:    400 mg (5 meltaway tablets)  72 - 95 pounds:    480 mg (6 meltaway tablets)  Greater than 96 pounds:   640 mg (8 meltaway tablets)     () Tylenol/Acetaminophen MELTAWAYS (160 MG tablets)    Child’s Weight:  Dose:  36 - 47 pounds:    240 mg (1 1/2 meltaway tablets)  48 - 59 pounds:    320 mg (2 meltaway tablets)  60 - 71 pounds:    400 mg (2 1/2 meltaway tablets)  72 - 95 pounds:    480 mg (3 meltaway tablets)  Greater than 96 pounds:   640 mg (4 meltaway tablets)      CHILDREN'S Ibuprofen (e.g., Advil or Motrin) may be given every six hours as needed for pain or fever.    48 - 59 pounds:                       200 mg (2 Teaspoons)  60 - 71 pounds:                       250 mg (2 1/2 Teaspoons)  72 - 95 pounds:                       300 mg (3 Teaspoons)    NEXT VISIT: 1 year      Thank you for entrusting your care to [facility].    Also, check out “Children’s Health” on the [facility] Blog for updates on timely topics regarding children’s health!

## 2024-08-16 ENCOUNTER — TELEPHONE (OUTPATIENT)
Dept: BARIATRICS/WEIGHT MGMT | Age: 54
End: 2024-08-16

## 2024-08-16 NOTE — TELEPHONE ENCOUNTER
Patient is due for their annual follow up and needs scheduled. I called the patient and left a detailed voicemail to call our office back.

## 2024-08-30 ENCOUNTER — TELEPHONE (OUTPATIENT)
Dept: BARIATRICS/WEIGHT MGMT | Age: 54
End: 2024-08-30

## 2024-08-30 NOTE — TELEPHONE ENCOUNTER
Attempt for follow up call was made and letter to follow up for the second attempt has been mailed out to the patient on 8/29/2024.

## 2024-12-23 ENCOUNTER — APPOINTMENT (OUTPATIENT)
Dept: GENERAL RADIOLOGY | Age: 54
End: 2024-12-23
Payer: COMMERCIAL

## 2024-12-23 ENCOUNTER — APPOINTMENT (OUTPATIENT)
Dept: CT IMAGING | Age: 54
End: 2024-12-23
Payer: COMMERCIAL

## 2024-12-23 ENCOUNTER — HOSPITAL ENCOUNTER (EMERGENCY)
Age: 54
Discharge: HOME | End: 2024-12-23
Payer: COMMERCIAL

## 2024-12-23 VITALS
SYSTOLIC BLOOD PRESSURE: 154 MMHG | DIASTOLIC BLOOD PRESSURE: 93 MMHG | HEIGHT: 67 IN | RESPIRATION RATE: 18 BRPM | WEIGHT: 182 LBS | BODY MASS INDEX: 28.56 KG/M2 | HEART RATE: 77 BPM | OXYGEN SATURATION: 99 % | TEMPERATURE: 98.2 F

## 2024-12-23 DIAGNOSIS — S16.1XXA STRAIN OF NECK MUSCLE, INITIAL ENCOUNTER: ICD-10-CM

## 2024-12-23 DIAGNOSIS — M54.2 NECK PAIN: ICD-10-CM

## 2024-12-23 DIAGNOSIS — S39.012A STRAIN OF LUMBAR REGION, INITIAL ENCOUNTER: ICD-10-CM
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
## 2024-12-23 PROCEDURE — 72128 CT CHEST SPINE W/O DYE: CPT

## 2024-12-23 PROCEDURE — 73030 X-RAY EXAM OF SHOULDER: CPT

## 2024-12-23 PROCEDURE — 72131 CT LUMBAR SPINE W/O DYE: CPT

## 2024-12-23 PROCEDURE — 72125 CT NECK SPINE W/O DYE: CPT

## 2024-12-23 PROCEDURE — 6370000000 HC RX 637 (ALT 250 FOR IP): Performed by: NURSE PRACTITIONER

## 2024-12-23 PROCEDURE — 99284 EMERGENCY DEPT VISIT MOD MDM: CPT

## 2024-12-23 RX ORDER — CYCLOBENZAPRINE HCL 10 MG
10 TABLET ORAL ONCE
Status: COMPLETED | OUTPATIENT
Start: 2024-12-23 | End: 2024-12-23

## 2024-12-23 RX ORDER — OXYCODONE HYDROCHLORIDE 5 MG/1
10 TABLET ORAL ONCE
Status: COMPLETED | OUTPATIENT
Start: 2024-12-23 | End: 2024-12-23

## 2024-12-23 RX ORDER — LIDOCAINE 50 MG/G
1 PATCH TOPICAL DAILY
Qty: 30 PATCH | Refills: 0 | Status: SHIPPED | OUTPATIENT
Start: 2024-12-23 | End: 2025-01-02

## 2024-12-23 RX ORDER — ORPHENADRINE CITRATE 30 MG/ML
60 INJECTION INTRAMUSCULAR; INTRAVENOUS ONCE
Status: DISCONTINUED | OUTPATIENT
Start: 2024-12-23 | End: 2024-12-23

## 2024-12-23 RX ADMIN — OXYCODONE HYDROCHLORIDE 10 MG: 5 TABLET ORAL at 21:00

## 2024-12-23 RX ADMIN — CYCLOBENZAPRINE 10 MG: 10 TABLET, FILM COATED ORAL at 21:00

## 2024-12-23 ASSESSMENT — PAIN DESCRIPTION - DESCRIPTORS: DESCRIPTORS: TENDER;ACHING

## 2024-12-23 ASSESSMENT — PAIN - FUNCTIONAL ASSESSMENT: PAIN_FUNCTIONAL_ASSESSMENT: 0-10

## 2024-12-23 ASSESSMENT — PAIN DESCRIPTION - LOCATION
LOCATION: SHOULDER;HEAD
LOCATION: BACK;NECK;SHOULDER

## 2024-12-23 ASSESSMENT — PAIN SCALES - GENERAL
PAINLEVEL_OUTOF10: 7
PAINLEVEL_OUTOF10: 8

## 2024-12-23 ASSESSMENT — PAIN DESCRIPTION - PAIN TYPE: TYPE: ACUTE PAIN

## 2024-12-23 ASSESSMENT — PAIN DESCRIPTION - FREQUENCY: FREQUENCY: CONTINUOUS

## 2024-12-23 ASSESSMENT — PAIN DESCRIPTION - ONSET: ONSET: SUDDEN
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
## 2024-12-24 NOTE — DISCHARGE INSTRUCTIONS
Keep active  Use ibuprofen tylenol as needed    Muscle relaxer as needed no work driving or alcohol on same    See doctor in followup

## 2025-01-13 ENCOUNTER — HOSPITAL ENCOUNTER (EMERGENCY)
Age: 55
Discharge: HOME | End: 2025-01-13
Payer: COMMERCIAL

## 2025-01-13 VITALS
OXYGEN SATURATION: 96 % | HEART RATE: 97 BPM | TEMPERATURE: 99.3 F | BODY MASS INDEX: 28.19 KG/M2 | SYSTOLIC BLOOD PRESSURE: 142 MMHG | DIASTOLIC BLOOD PRESSURE: 92 MMHG | RESPIRATION RATE: 19 BRPM | WEIGHT: 180 LBS

## 2025-01-13 DIAGNOSIS — J10.1 INFLUENZA A: Primary | ICD-10-CM

## 2025-01-13 LAB
FLUAV RNA RESP QL NAA+PROBE: DETECTED
FLUBV RNA RESP QL NAA+PROBE: NOT DETECTED
SARS-COV-2 RNA RESP QL NAA+PROBE: NOT DETECTED
SOURCE: ABNORMAL
SPECIMEN DESCRIPTION: ABNORMAL

## 2025-01-13 PROCEDURE — 87636 SARSCOV2 & INF A&B AMP PRB: CPT

## 2025-01-13 PROCEDURE — 99211 OFF/OP EST MAY X REQ PHY/QHP: CPT

## 2025-01-13 RX ORDER — PREDNISONE 20 MG/1
20 TABLET ORAL DAILY
Qty: 5 TABLET | Refills: 0 | Status: SHIPPED | OUTPATIENT
Start: 2025-01-13 | End: 2025-01-18

## 2025-01-13 RX ORDER — BENZONATATE 200 MG/1
200 CAPSULE ORAL 3 TIMES DAILY PRN
Qty: 15 CAPSULE | Refills: 0 | Status: SHIPPED | OUTPATIENT
Start: 2025-01-13

## 2025-01-13 RX ORDER — ONDANSETRON 4 MG/1
4 TABLET, ORALLY DISINTEGRATING ORAL 3 TIMES DAILY PRN
Qty: 15 TABLET | Refills: 0 | Status: SHIPPED | OUTPATIENT
Start: 2025-01-13

## 2025-01-13 ASSESSMENT — PAIN SCALES - GENERAL: PAINLEVEL_OUTOF10: 8

## 2025-01-13 ASSESSMENT — PAIN - FUNCTIONAL ASSESSMENT: PAIN_FUNCTIONAL_ASSESSMENT: 0-10

## 2025-01-13 ASSESSMENT — PAIN DESCRIPTION - DESCRIPTORS: DESCRIPTORS: ACHING;DISCOMFORT;SORE

## 2025-01-13 ASSESSMENT — PAIN DESCRIPTION - FREQUENCY: FREQUENCY: CONTINUOUS

## 2025-01-13 ASSESSMENT — PAIN DESCRIPTION - LOCATION: LOCATION: HEAD;CHEST

## 2025-01-14 NOTE — ED PROVIDER NOTES
--------------------------------------------    Vitals:    01/13/25 1915   BP: (!) 142/92   Pulse: 97   Resp: 19   Temp: 99.3 °F (37.4 °C)   TempSrc: Temporal   SpO2: 96%   Weight: 81.6 kg (180 lb)     Oxygen Saturation Interpretation: Normal     Physical Exam  Vitals and nursing note reviewed.   Constitutional:       General: She is not in acute distress.     Appearance: Normal appearance.   HENT:      Head: Normocephalic and atraumatic.      Jaw: There is normal jaw occlusion.      Right Ear: Hearing and external ear normal. Tympanic membrane is bulging.      Left Ear: Hearing and external ear normal. Tympanic membrane is bulging.      Nose: Congestion and rhinorrhea present.      Mouth/Throat:      Mouth: Mucous membranes are moist. No angioedema.      Pharynx: Oropharynx is clear. Uvula midline. Postnasal drip present. No pharyngeal swelling, oropharyngeal exudate, posterior oropharyngeal erythema or uvula swelling.      Comments: Oropharynx is patent.     Eyes:      Extraocular Movements: Extraocular movements intact.      Conjunctiva/sclera: Conjunctivae normal.      Pupils: Pupils are equal, round, and reactive to light.   Cardiovascular:      Rate and Rhythm: Normal rate and regular rhythm.      Pulses: Normal pulses.      Heart sounds: Normal heart sounds.   Pulmonary:      Effort: Pulmonary effort is normal.      Breath sounds: Normal breath sounds.   Musculoskeletal:         General: Normal range of motion.      Cervical back: Full passive range of motion without pain, normal range of motion and neck supple.   Lymphadenopathy:      Cervical: No cervical adenopathy.   Skin:     General: Skin is warm and dry.      Findings: No rash.   Neurological:      General: No focal deficit present.      Mental Status: She is alert and oriented to person, place, and time.   Psychiatric:         Behavior: Behavior is cooperative.         -------------------------------------------------- RESULTS

## 2025-03-31 ENCOUNTER — TELEPHONE (OUTPATIENT)
Dept: BARIATRICS/WEIGHT MGMT | Age: 55
End: 2025-03-31

## (undated) DEVICE — MEDI-VAC NON-CONDUCTIVE SUCTION TUBING: Brand: CARDINAL HEALTH

## (undated) DEVICE — IRON INTERN

## (undated) DEVICE — APPLICATOR MEDICATED 26 CC SOLUTION HI LT ORNG CHLORAPREP

## (undated) DEVICE — NDL CNTR 40CT FM MAG: Brand: MEDLINE INDUSTRIES, INC.

## (undated) DEVICE — SOLUTION IV IRRIG POUR BRL 0.9% SODIUM CHL 2F7124

## (undated) DEVICE — MASK,FACE,MAXFLUIDPROTECT,SHIELD/ERLPS: Brand: MEDLINE

## (undated) DEVICE — ELECTRODE PT RET AD L9FT HI MOIST COND ADH HYDRGEL CORDED

## (undated) DEVICE — PITCHER PT 1200ML W HNDL CSR WRP

## (undated) DEVICE — LUBRICANT SURG JELLY ST BACTER TUBE 4.25OZ

## (undated) DEVICE — PUMP SUC IRR TBNG L10FT W/ HNDPC ASSEMB STRYKEFLOW 2

## (undated) DEVICE — PACK SURG LAP CHOLE CUSTOM

## (undated) DEVICE — VALVE SUCTION AIR H2O HYDR H2O JET CONN STRL ORCA POD + DISP

## (undated) DEVICE — APPLICATOR SURG XL L38CM FOR ARISTA ABSRB HEMSTAT FLEXITIP

## (undated) DEVICE — TUBING, SUCTION, 1/4" X 10', STRAIGHT: Brand: MEDLINE

## (undated) DEVICE — SET ENDO INSTR LAPAROSCOPIC INCISIONAL

## (undated) DEVICE — PLUMEPORT LAPAROSCOPIC SMOKE FILTRATION DEVICE: Brand: PLUMEPORT ACTIV

## (undated) DEVICE — CONTAINER SPEC COLL 960ML POLYPR TRIANG GRAD INTAKE/OUTPUT

## (undated) DEVICE — GLOVE ORANGE PI 8   MSG9080

## (undated) DEVICE — Z INACTIVE USE 2660664 SOLUTION IRRIG 3000ML 0.9% SOD CHL USP UROMATIC PLAS CONT

## (undated) DEVICE — TROCAR: Brand: KII SLEEVE

## (undated) DEVICE — NEEDLE HYPO 25GA L1.5IN BLU POLYPR HUB S STL REG BVL STR

## (undated) DEVICE — EXTRA LONG 45 DEGREE LENS

## (undated) DEVICE — SPONGE GZ 4IN 4IN 4 PLY N WVN AVANT

## (undated) DEVICE — TROCAR: Brand: KII FIOS FIRST ENTRY

## (undated) DEVICE — [HIGH FLOW INSUFFLATOR,  DO NOT USE IF PACKAGE IS DAMAGED,  KEEP DRY,  KEEP AWAY FROM SUNLIGHT,  PROTECT FROM HEAT AND RADIOACTIVE SOURCES.]: Brand: PNEUMOSURE

## (undated) DEVICE — MARKER,SKIN,WI/RULER AND LABELS: Brand: MEDLINE

## (undated) DEVICE — SYRINGE MED 10ML TRNSLUC BRL PLUNG BLK MRK POLYPR CTRL

## (undated) DEVICE — CONTAINER SPEC 480ML CLR POLYSTYR 10% NEUT BUFF FRMLN ZN

## (undated) DEVICE — LAPAROSCOPIC SCISSORS: Brand: EPIX LAPAROSCOPIC SCISSORS

## (undated) DEVICE — CAMERA STRYKER 1488 HD GEN

## (undated) DEVICE — FORCEPS BX L240CM JAW DIA2.8MM L CAP W/ NDL MIC MESH TOOTH

## (undated) DEVICE — SHEARS LAP L45CM DIA5MM ULTRASONIC CRV TIP ADV HEMSTAS HARM

## (undated) DEVICE — STAPLER SKIN L440MM 32MM LNG 12 FIRING B FRM PWR + GRIPPING

## (undated) DEVICE — GOWN,SIRUS,NONRNF,SETINSLV,XL,20/CS: Brand: MEDLINE

## (undated) DEVICE — KENDALL 450 SERIES MONITORING FOAM ELECTRODE - RECTANGULAR SHAPE ( 3/PK): Brand: KENDALL

## (undated) DEVICE — PMI PTFE COATED LAPAROSCOPIC WIRE L-HOOK 44 CM: Brand: PMI

## (undated) DEVICE — SUTURE ABSRB L6IN L37MM 0 GS-21 GRN 1/2 CIR TAPR PNT NDL VLOCL0306

## (undated) DEVICE — KIT BEDSIDE REVITAL OX 500ML

## (undated) DEVICE — MEDI-VAC YANKAUER SUCTION HANDLE W/BULBOUS TIP: Brand: CARDINAL HEALTH

## (undated) DEVICE — NEEDLE SPNL 22GA L3.5IN BLK HUB S STL REG WALL FIT STYL W/

## (undated) DEVICE — GARMENT,MEDLINE,DVT,INT,CALF,MED, GEN2: Brand: MEDLINE

## (undated) DEVICE — SUTURE V-LOC 180 SZ 0 L9IN ABSRB GRN GS-21 L37MM 1/2 CIR VLOCL0346

## (undated) DEVICE — BLOCK BITE 60FR CAREGUARD

## (undated) DEVICE — SYRINGE 20ML LL S/C 50

## (undated) DEVICE — APPLIER CLP L SHFT DIA12MM 20 ROT MULT LIGACLP

## (undated) DEVICE — DOUBLE BASIN SET: Brand: MEDLINE INDUSTRIES, INC.

## (undated) DEVICE — COVER,TABLE,44X90,STERILE: Brand: MEDLINE

## (undated) DEVICE — RELOAD STPL L60MM H1.5-3.6MM REG TISS BLU GRIPPING SURF B

## (undated) DEVICE — TOWEL,OR,DSP,ST,BLUE,STD,6/PK,12PK/CS: Brand: MEDLINE

## (undated) DEVICE — GOWN ISOLATN REG YEL M WT MULTIPLY SIDETIE LEV 2

## (undated) DEVICE — COVER,LIGHT HANDLE,FLX,1/PK: Brand: MEDLINE INDUSTRIES, INC.

## (undated) DEVICE — AIRLIFE™ ADULT OXYGEN MASK VINYL, UNDER THE CHIN STYLE, 3 IN 1 MASK WITH SAFETY VENT, WITH 7 FEET (2.1 M) CRUSH RESISTANT OXYGEN TUBING: Brand: AIRLIFE™

## (undated) DEVICE — RELOAD STPL L60MM H1-2.6MM MESENTERY THN TISS WHT 6 ROW

## (undated) DEVICE — GLOVE ORANGE PI 7 1/2   MSG9075

## (undated) DEVICE — 6 X 9  1.75MIL 4-WALL LABGUARD: Brand: MINIGRIP COMMERCIAL LLC